# Patient Record
Sex: FEMALE | Race: OTHER | NOT HISPANIC OR LATINO | URBAN - METROPOLITAN AREA
[De-identification: names, ages, dates, MRNs, and addresses within clinical notes are randomized per-mention and may not be internally consistent; named-entity substitution may affect disease eponyms.]

---

## 2022-06-20 ENCOUNTER — EMERGENCY (EMERGENCY)
Facility: HOSPITAL | Age: 22
LOS: 1 days | Discharge: HOME | End: 2022-06-20
Attending: EMERGENCY MEDICINE
Payer: MEDICAID

## 2022-06-20 VITALS
SYSTOLIC BLOOD PRESSURE: 110 MMHG | HEART RATE: 99 BPM | DIASTOLIC BLOOD PRESSURE: 77 MMHG | RESPIRATION RATE: 18 BRPM | OXYGEN SATURATION: 98 % | TEMPERATURE: 99 F | WEIGHT: 164.91 LBS | HEIGHT: 65 IN

## 2022-06-20 LAB
ALBUMIN SERPL ELPH-MCNC: 4.3 G/DL — SIGNIFICANT CHANGE UP (ref 3.3–5)
ALP SERPL-CCNC: 68 U/L — SIGNIFICANT CHANGE UP (ref 40–120)
ALT FLD-CCNC: 35 U/L — SIGNIFICANT CHANGE UP (ref 10–45)
ANION GAP SERPL CALC-SCNC: 14 MMOL/L — SIGNIFICANT CHANGE UP (ref 5–17)
AST SERPL-CCNC: 24 U/L — SIGNIFICANT CHANGE UP (ref 10–40)
BILIRUB SERPL-MCNC: 0.4 MG/DL — SIGNIFICANT CHANGE UP (ref 0.2–1.2)
BUN SERPL-MCNC: 7 MG/DL — SIGNIFICANT CHANGE UP (ref 7–23)
CALCIUM SERPL-MCNC: 9.6 MG/DL — SIGNIFICANT CHANGE UP (ref 8.4–10.5)
CHLORIDE SERPL-SCNC: 99 MMOL/L — SIGNIFICANT CHANGE UP (ref 96–108)
CO2 SERPL-SCNC: 24 MMOL/L — SIGNIFICANT CHANGE UP (ref 22–31)
CREAT SERPL-MCNC: 0.49 MG/DL — LOW (ref 0.5–1.3)
EGFR: 137 ML/MIN/1.73M2 — SIGNIFICANT CHANGE UP
GLUCOSE SERPL-MCNC: 91 MG/DL — SIGNIFICANT CHANGE UP (ref 70–99)
POTASSIUM SERPL-MCNC: 4.2 MMOL/L — SIGNIFICANT CHANGE UP (ref 3.5–5.3)
POTASSIUM SERPL-SCNC: 4.2 MMOL/L — SIGNIFICANT CHANGE UP (ref 3.5–5.3)
PROT SERPL-MCNC: 7.5 G/DL — SIGNIFICANT CHANGE UP (ref 6–8.3)
SODIUM SERPL-SCNC: 137 MMOL/L — SIGNIFICANT CHANGE UP (ref 135–145)

## 2022-06-20 PROCEDURE — 99285 EMERGENCY DEPT VISIT HI MDM: CPT | Mod: 25

## 2022-06-20 PROCEDURE — 62270 DX LMBR SPI PNXR: CPT

## 2022-06-20 NOTE — ED ADULT NURSE NOTE - NSIMPLEMENTINTERV_GEN_ALL_ED
Implemented All Fall Risk Interventions:  [place] to call system. Call bell, personal items and telephone within reach. Instruct patient to call for assistance. Room bathroom lighting operational. Non-slip footwear when patient is off stretcher. Physically safe environment: no spills, clutter or unnecessary equipment. Stretcher in lowest position, wheels locked, appropriate side rails in place. Provide visual cue, wrist band, yellow gown, etc. Monitor gait and stability. Monitor for mental status changes and reorient to person, place, and time. Review medications for side effects contributing to fall risk. Reinforce activity limits and safety measures with patient and family.

## 2022-06-20 NOTE — ED PROVIDER NOTE - CLINICAL SUMMARY MEDICAL DECISION MAKING FREE TEXT BOX
Willi Hyde (MD): 21y F presents to the ED sent in by her neurologist for evaluation of GBS. Will discuss w/ neuro. Will likely ct head and lumbar puncture.

## 2022-06-20 NOTE — ED ADULT NURSE NOTE - OBJECTIVE STATEMENT
Pt is a 21y F sent to ED from neurologist for GBS evaluation. Pt states she has had bilateral lower leg numbness/tingling and fingertip numbness/tingling x2 weeks. Pt endorses feet pain. Pt denies fever, chills, cough, sob, chest pain, abd pain, NVD. Pt A&Ox3, breathing unlabored and spontaneous, abd soft nontender nondistended. Pt resting in stretcher, placed on cardiac monitor, bed in lowest position, educated on call bell, family member at bedside, aware of plan of care. Comfort and safety measures maintained.

## 2022-06-20 NOTE — ED PROVIDER NOTE - PROGRESS NOTE DETAILS
Willi Hyde (MD): lumbar puncture performed. Discussed w/ neuro to see patient. Chele Mendez D.O., PGY3 (Resident)  Patient signed out to me. Hemodynamically stable. LP w/o signs of GBS. Discussed and evaled by neuro. Stable for outpatient f/u. Strict return precautions with verbal understanding expressed.

## 2022-06-20 NOTE — ED PROVIDER NOTE - PATIENT PORTAL LINK FT
You can access the FollowMyHealth Patient Portal offered by [facility] by registering at the following website: [email or web address]. By joining [employer]’s FollowMyHealth portal, you will also be able to view your health information using other applications (apps) compatible with our system.

## 2022-06-20 NOTE — ED PROVIDER NOTE - ATTENDING CONTRIBUTION TO CARE
Dr. JESUS Hyde:  I have personally evaluated the patient and have documented above as appropriate. I perfomed a substantive portion of the visit including all aspects of the medical decision making.

## 2022-06-20 NOTE — ED ADULT TRIAGE NOTE - LOCATION:
Care Due:                  Date            Visit Type   Department     Provider  --------------------------------------------------------------------------------                                [facility]                              PRIMARY      MED/ INTERNAL  Last Visit: 06-      CARE ([facility])   MED/ PEDS      CESILIA BLANCAS  Next Visit: None Scheduled  None         None Found                                                            Last  Test          Frequency    Reason                     Performed    Due Date  --------------------------------------------------------------------------------    Office Visit  12 months..  allopurinoL, colchicine,   06- 06-                             glimepiride..............    CBC.........  12 months..  allopurinoL..............  06- 06-    CMP.........  12 months..  allopurinoL, colchicine,   06- 06-                             glimepiride..............    [facility] Embedded Care Gaps. Reference number: [id number]. 6/06/2022   6:23:15 PM CDT   Right arm;

## 2022-06-20 NOTE — ED PROVIDER NOTE - OBJECTIVE STATEMENT
21y F w/ PMHx of DM (Steglatro, stopped 1 month ago) presents to the ED sent in by her neurologist for evaluation of GBS. Pt states she went to her neurologist due to worsening B/L LE and fingertip numbness/tingling x2wks and B/L foot pain. Denies recent illness. Pt taking Gabapentin for pain now. Pt reports having a vitamin deficiency in recent blood test and has been taking vitamins. Also had intentional weight loss of about 90 lb since Jan. Denies back pain, HA, neck pain.    Neurologist Dr. Tiera Isbell in [place] T:[phone]

## 2022-06-20 NOTE — ED PROVIDER NOTE - NSFOLLOWUPINSTRUCTIONS_ED_ALL_ED_FT
YOU WERE SEEN IN THE ED FOR: numbness, tingling    FOLLOW UP WITH YOUR NEUROLOGIST.    PLEASE FOLLOW UP WITH YOUR PRIVATE PHYSICIAN WITHIN THE NEXT 48 HOURS. BRING COPIES OF YOUR RESULTS.    RETURN TO THE EMERGENCY DEPARTMENT IF YOU EXPERIENCE ANY NEW/CONCERNING/WORSENING SYMPTOMS.

## 2022-06-21 VITALS
RESPIRATION RATE: 18 BRPM | TEMPERATURE: 99 F | SYSTOLIC BLOOD PRESSURE: 116 MMHG | HEART RATE: 84 BPM | OXYGEN SATURATION: 100 % | DIASTOLIC BLOOD PRESSURE: 63 MMHG

## 2022-06-21 LAB
APPEARANCE CSF: CLEAR — SIGNIFICANT CHANGE UP
APPEARANCE CSF: CLEAR — SIGNIFICANT CHANGE UP
BASOPHILS # BLD AUTO: 0.07 K/UL — SIGNIFICANT CHANGE UP (ref 0–0.2)
BASOPHILS NFR BLD AUTO: 0.8 % — SIGNIFICANT CHANGE UP (ref 0–2)
COLOR CSF: SIGNIFICANT CHANGE UP
COLOR CSF: SIGNIFICANT CHANGE UP
EOSINOPHIL # BLD AUTO: 0.03 K/UL — SIGNIFICANT CHANGE UP (ref 0–0.5)
EOSINOPHIL NFR BLD AUTO: 0.4 % — SIGNIFICANT CHANGE UP (ref 0–6)
GLUCOSE CSF-MCNC: 60 MG/DL — SIGNIFICANT CHANGE UP (ref 40–70)
GRAM STN FLD: SIGNIFICANT CHANGE UP
HCG SERPL-ACNC: <2 MIU/ML — SIGNIFICANT CHANGE UP
HCT VFR BLD CALC: 42.4 % — SIGNIFICANT CHANGE UP (ref 34.5–45)
HGB BLD-MCNC: 13.8 G/DL — SIGNIFICANT CHANGE UP (ref 11.5–15.5)
IMM GRANULOCYTES NFR BLD AUTO: 0.1 % — SIGNIFICANT CHANGE UP (ref 0–1.5)
LDH CSF L TO P-CCNC: <15 U/L — SIGNIFICANT CHANGE UP
LDH FLD-CCNC: <15 U/L — SIGNIFICANT CHANGE UP
LYMPHOCYTES # BLD AUTO: 3.33 K/UL — HIGH (ref 1–3.3)
LYMPHOCYTES # BLD AUTO: 39 % — SIGNIFICANT CHANGE UP (ref 13–44)
LYMPHOCYTES # CSF: 90 % — HIGH (ref 40–80)
LYMPHOCYTES # CSF: 90 % — HIGH (ref 40–80)
MCHC RBC-ENTMCNC: 26.7 PG — LOW (ref 27–34)
MCHC RBC-ENTMCNC: 32.5 GM/DL — SIGNIFICANT CHANGE UP (ref 32–36)
MCV RBC AUTO: 82 FL — SIGNIFICANT CHANGE UP (ref 80–100)
MONOCYTES # BLD AUTO: 0.73 K/UL — SIGNIFICANT CHANGE UP (ref 0–0.9)
MONOCYTES NFR BLD AUTO: 8.5 % — SIGNIFICANT CHANGE UP (ref 2–14)
MONOS+MACROS NFR CSF: 10 % — LOW (ref 15–45)
MONOS+MACROS NFR CSF: 10 % — LOW (ref 15–45)
NEUTROPHILS # BLD AUTO: 4.37 K/UL — SIGNIFICANT CHANGE UP (ref 1.8–7.4)
NEUTROPHILS # CSF: SIGNIFICANT CHANGE UP (ref 0–6)
NEUTROPHILS # CSF: SIGNIFICANT CHANGE UP (ref 0–6)
NEUTROPHILS NFR BLD AUTO: 51.2 % — SIGNIFICANT CHANGE UP (ref 43–77)
NRBC # BLD: 0 /100 WBCS — SIGNIFICANT CHANGE UP (ref 0–0)
NRBC NFR CSF: <1 — SIGNIFICANT CHANGE UP (ref 0–5)
NRBC NFR CSF: <1 — SIGNIFICANT CHANGE UP (ref 0–5)
PLATELET # BLD AUTO: 289 K/UL — SIGNIFICANT CHANGE UP (ref 150–400)
PROT CSF-MCNC: 26 MG/DL — SIGNIFICANT CHANGE UP (ref 15–45)
RAPID RVP RESULT: SIGNIFICANT CHANGE UP
RBC # BLD: 5.17 M/UL — SIGNIFICANT CHANGE UP (ref 3.8–5.2)
RBC # CSF: 1 /UL — HIGH (ref 0–0)
RBC # CSF: 18 /UL — HIGH (ref 0–0)
RBC # FLD: 19.1 % — HIGH (ref 10.3–14.5)
SARS-COV-2 RNA SPEC QL NAA+PROBE: SIGNIFICANT CHANGE UP
SPECIMEN SOURCE: SIGNIFICANT CHANGE UP
TUBE TYPE: SIGNIFICANT CHANGE UP
TUBE TYPE: SIGNIFICANT CHANGE UP
WBC # BLD: 8.54 K/UL — SIGNIFICANT CHANGE UP (ref 3.8–10.5)
WBC # FLD AUTO: 8.54 K/UL — SIGNIFICANT CHANGE UP (ref 3.8–10.5)

## 2022-06-21 PROCEDURE — 85025 COMPLETE CBC W/AUTO DIFF WBC: CPT

## 2022-06-21 PROCEDURE — 62270 DX LMBR SPI PNXR: CPT

## 2022-06-21 PROCEDURE — 82945 GLUCOSE OTHER FLUID: CPT

## 2022-06-21 PROCEDURE — 70450 CT HEAD/BRAIN W/O DYE: CPT | Mod: 26,MA

## 2022-06-21 PROCEDURE — 82550 ASSAY OF CK (CPK): CPT

## 2022-06-21 PROCEDURE — 0225U NFCT DS DNA&RNA 21 SARSCOV2: CPT

## 2022-06-21 PROCEDURE — 84157 ASSAY OF PROTEIN OTHER: CPT

## 2022-06-21 PROCEDURE — 36415 COLL VENOUS BLD VENIPUNCTURE: CPT

## 2022-06-21 PROCEDURE — 83615 LACTATE (LD) (LDH) ENZYME: CPT

## 2022-06-21 PROCEDURE — 87205 SMEAR GRAM STAIN: CPT

## 2022-06-21 PROCEDURE — 80053 COMPREHEN METABOLIC PANEL: CPT

## 2022-06-21 PROCEDURE — 83735 ASSAY OF MAGNESIUM: CPT

## 2022-06-21 PROCEDURE — 89051 BODY FLUID CELL COUNT: CPT

## 2022-06-21 PROCEDURE — 70450 CT HEAD/BRAIN W/O DYE: CPT | Mod: MA

## 2022-06-21 PROCEDURE — 87070 CULTURE OTHR SPECIMN AEROBIC: CPT

## 2022-06-21 PROCEDURE — 99284 EMERGENCY DEPT VISIT MOD MDM: CPT | Mod: 25

## 2022-06-21 PROCEDURE — 84702 CHORIONIC GONADOTROPIN TEST: CPT

## 2022-06-21 NOTE — CONSULT NOTE ADULT - ASSESSMENT
Patient is a 22yo RT handed F with pmh of HLD and DM-II presents to [facility] for concern for GBS. Patient states she noted her symptoms 3 weeks ago of LE tingling with UE numbness b/l 2 weeks ago with improvement of symptoms. Patient negative LP results.     Impression   LE numbness with improvement of UE numbness 2/2 to possible metabolic etiology. Less likely GBS.   Recommendations:   Correction of electrolytes as needed  Adequate fluid hydration   b12, methylmalonic acid, homocysteine,   folate, tsh, hgb a1c, B1, B6, Iron studies, heavy metal screen, lead.   U/A   Continue current home medications  Recommend increasing Gabapentin dose, defer to PCP   Follow up with her Neurologist, Dr. Isbell upon discharge   No further inpatient neurological workup       Case discussed with Neurology Attending, Dr. Portillo.

## 2022-06-21 NOTE — CONSULT NOTE ADULT - NSCONSULTADDITIONALINFOA_GEN_ALL_CORE
This patient was seen overnight by the resident and I did not see or evaluate the patient before discharge from the ED. I discussed the care with the resident and my understanding is that the ED was in agreement with the plan. I agree with the plan as described above per my discussion with the resident.    STEPHEN

## 2022-06-21 NOTE — CONSULT NOTE ADULT - SUBJECTIVE AND OBJECTIVE BOX
MRN-[id number]  Patient is a 21y old  Female who presents with a chief complaint of   HPI:  Patient is a 22yo RT handed F with pmh of HLD and DM-II presents to [facility] for concern for GBS. Patient states she noted her symptoms 3 weeks ago of LE tingling. Patient then 2 weeks ago noted UE numbness whcih then improved to fingertip numbness. Patient states symptoms worsen when she lays down a certain way or applies pressure near her elbows. Patient has rt foot pain as well and was told it was neuropathy by her PCP. Patient was referred to a Neurologist, Dr. Isbell and was advised to go the ED for further workup for GBS. Patient states her symptoms are improving. Patient had negative LP and CT head results were unremarkable.     PAST MEDICAL & SURGICAL HISTORY:    FAMILY HISTORY:    Social Hx:  Nonsmoker, no drug or alcohol use    Home Medications:    MEDICATIONS  (STANDING):    MEDICATIONS  (PRN):    Allergies  No Known Allergies    Intolerances      REVIEW OF SYSTEMS  General: Denies fever and chills  Ophthalmologic: Denies blurred vision   Respiratory and Thorax:	Denies SOB  Cardiovascular:	Denies CP   Gastrointestinal:	Denies N, V   Genitourinary: Denies UC  Musculoskeletal:	Denies muscle pain   Neurological: Denies headaches 	      ROS: Pertinent positives in HPI, all other ROS were reviewed and are negative.      Vital Signs Last 24 Hrs  T(C): 37 (20 Jun 2022 23:15), Max: 37.1 (20 Jun 2022 18:44)  T(F): 98.6 (20 Jun 2022 23:15), Max: 98.7 (20 Jun 2022 18:44)  HR: 99 (20 Jun 2022 23:15) (99 - 99)  BP: 131/89 (20 Jun 2022 23:15) (110/77 - 131/89)  BP(mean): 94 (20 Jun 2022 23:15) (94 - 94)  RR: 18 (20 Jun 2022 23:15) (18 - 18)  SpO2: 100% (20 Jun 2022 23:15) (98% - 100%)    GENERAL EXAM:  Constitutional: awake and alert. NAD  HEENT: PERRLA, EOMI  Musculoskeletal: no joint swelling/tenderness, no abnormal movements  Skin: no rashes    NEUROLOGICAL EXAM:  MS: AAOX3,  speech is fluent.Follows simple and complex commands.   CN: VFF, EOMI, PERRL,   V1-3 intact, no facial asymmetry, t/p midline, SCM/trap intact.  Motor: Strength: 5/5 4x.   Tone: normal. Bulk: normal.   DTR 2+ symm.  in biceps/triceps/brachoradialis. + 1 b/l in patellar b/l.   ankle reflex b/l.   babinski negative b/l.   Plantar flex b/l.   Sensation: intact to LT//Vibration/Position/Temperature 4x.  Pinprick decreased in L4 level and below.   Coordination: intact 4x.   Gait:  Romberg negative, pull test negative; walks with narrow base, pivots in 2 steps.    NIHSS 0  mRS 0    Labs:   cbc                      13.8   8.54  )-----------( 289      ( 21 Jun 2022 00:22 )             42.4     [id number]    137  |  99  |  7   ----------------------------<  91  4.2   |  24  |  0.49<L>    Ca    9.6      20 Jun 2022 23:15  Mg     2.1     06-20    TPro  7.5  /  Alb  4.3  /  TBili  0.4  /  DBili  x   /  AST  24  /  ALT  35  /  AlkPhos  68  06-20    CardiacMarkersCARDIAC MARKERS ( 20 Jun 2022 23:15 )  x     / x     / 91 U/L / x     / x          LFTsLIVER FUNCTIONS - ( 20 Jun 2022 23:15 )  Alb: 4.3 g/dL / Pro: 7.5 g/dL / ALK PHOS: 68 U/L / ALT: 35 U/L / AST: 24 U/L / GGT: x             CSF:   Glucose 60   Protien 26   LDH <15       Radiology:  CT head- prelim neg

## 2022-06-23 LAB
CULTURE RESULTS: NO GROWTH — SIGNIFICANT CHANGE UP
SPECIMEN SOURCE: SIGNIFICANT CHANGE UP

## 2022-06-27 ENCOUNTER — INPATIENT (INPATIENT)
Facility: HOSPITAL | Age: 22
LOS: 10 days | Discharge: HOME | DRG: 640 | End: 2022-07-08
Attending: INTERNAL MEDICINE | Admitting: STUDENT IN AN ORGANIZED HEALTH CARE EDUCATION/TRAINING PROGRAM
Payer: MEDICAID

## 2022-06-27 VITALS
RESPIRATION RATE: 20 BRPM | TEMPERATURE: 98 F | OXYGEN SATURATION: 98 % | HEART RATE: 117 BPM | WEIGHT: 164.91 LBS | SYSTOLIC BLOOD PRESSURE: 112 MMHG | DIASTOLIC BLOOD PRESSURE: 77 MMHG | HEIGHT: 65 IN

## 2022-06-27 LAB
ALBUMIN SERPL ELPH-MCNC: 4.4 G/DL — SIGNIFICANT CHANGE UP (ref 3.3–5)
ALP SERPL-CCNC: 62 U/L — SIGNIFICANT CHANGE UP (ref 40–120)
ALT FLD-CCNC: 27 U/L — SIGNIFICANT CHANGE UP (ref 10–45)
ANION GAP SERPL CALC-SCNC: 12 MMOL/L — SIGNIFICANT CHANGE UP (ref 5–17)
APTT BLD: 26.5 SEC — LOW (ref 27.5–35.5)
AST SERPL-CCNC: 12 U/L — SIGNIFICANT CHANGE UP (ref 10–40)
BASOPHILS # BLD AUTO: 0.05 K/UL — SIGNIFICANT CHANGE UP (ref 0–0.2)
BASOPHILS NFR BLD AUTO: 0.6 % — SIGNIFICANT CHANGE UP (ref 0–2)
BILIRUB SERPL-MCNC: 0.6 MG/DL — SIGNIFICANT CHANGE UP (ref 0.2–1.2)
BUN SERPL-MCNC: 8 MG/DL — SIGNIFICANT CHANGE UP (ref 7–23)
CALCIUM SERPL-MCNC: 10 MG/DL — SIGNIFICANT CHANGE UP (ref 8.4–10.5)
CHLORIDE SERPL-SCNC: 103 MMOL/L — SIGNIFICANT CHANGE UP (ref 96–108)
CO2 SERPL-SCNC: 25 MMOL/L — SIGNIFICANT CHANGE UP (ref 22–31)
CREAT SERPL-MCNC: 0.57 MG/DL — SIGNIFICANT CHANGE UP (ref 0.5–1.3)
EGFR: 132 ML/MIN/1.73M2 — SIGNIFICANT CHANGE UP
EOSINOPHIL # BLD AUTO: 0.16 K/UL — SIGNIFICANT CHANGE UP (ref 0–0.5)
EOSINOPHIL NFR BLD AUTO: 2.1 % — SIGNIFICANT CHANGE UP (ref 0–6)
GLUCOSE SERPL-MCNC: 115 MG/DL — HIGH (ref 70–99)
HCG SERPL-ACNC: <2 MIU/ML — SIGNIFICANT CHANGE UP
HCT VFR BLD CALC: 42.4 % — SIGNIFICANT CHANGE UP (ref 34.5–45)
HGB BLD-MCNC: 13.3 G/DL — SIGNIFICANT CHANGE UP (ref 11.5–15.5)
IMM GRANULOCYTES NFR BLD AUTO: 0.4 % — SIGNIFICANT CHANGE UP (ref 0–1.5)
INR BLD: 1.15 RATIO — SIGNIFICANT CHANGE UP (ref 0.88–1.16)
LYMPHOCYTES # BLD AUTO: 2.61 K/UL — SIGNIFICANT CHANGE UP (ref 1–3.3)
LYMPHOCYTES # BLD AUTO: 33.7 % — SIGNIFICANT CHANGE UP (ref 13–44)
MAGNESIUM SERPL-MCNC: 2 MG/DL — SIGNIFICANT CHANGE UP (ref 1.6–2.6)
MCHC RBC-ENTMCNC: 26.2 PG — LOW (ref 27–34)
MCHC RBC-ENTMCNC: 31.4 GM/DL — LOW (ref 32–36)
MCV RBC AUTO: 83.6 FL — SIGNIFICANT CHANGE UP (ref 80–100)
MONOCYTES # BLD AUTO: 0.54 K/UL — SIGNIFICANT CHANGE UP (ref 0–0.9)
MONOCYTES NFR BLD AUTO: 7 % — SIGNIFICANT CHANGE UP (ref 2–14)
NEUTROPHILS # BLD AUTO: 4.36 K/UL — SIGNIFICANT CHANGE UP (ref 1.8–7.4)
NEUTROPHILS NFR BLD AUTO: 56.2 % — SIGNIFICANT CHANGE UP (ref 43–77)
NRBC # BLD: 0 /100 WBCS — SIGNIFICANT CHANGE UP (ref 0–0)
PHOSPHATE SERPL-MCNC: 4.4 MG/DL — SIGNIFICANT CHANGE UP (ref 2.5–4.5)
PLATELET # BLD AUTO: 288 K/UL — SIGNIFICANT CHANGE UP (ref 150–400)
POTASSIUM SERPL-MCNC: 3.8 MMOL/L — SIGNIFICANT CHANGE UP (ref 3.5–5.3)
POTASSIUM SERPL-SCNC: 3.8 MMOL/L — SIGNIFICANT CHANGE UP (ref 3.5–5.3)
PROT SERPL-MCNC: 7.7 G/DL — SIGNIFICANT CHANGE UP (ref 6–8.3)
PROTHROM AB SERPL-ACNC: 13.4 SEC — SIGNIFICANT CHANGE UP (ref 10.5–13.4)
RBC # BLD: 5.07 M/UL — SIGNIFICANT CHANGE UP (ref 3.8–5.2)
RBC # FLD: 18.1 % — HIGH (ref 10.3–14.5)
SARS-COV-2 RNA SPEC QL NAA+PROBE: SIGNIFICANT CHANGE UP
SODIUM SERPL-SCNC: 140 MMOL/L — SIGNIFICANT CHANGE UP (ref 135–145)
WBC # BLD: 7.75 K/UL — SIGNIFICANT CHANGE UP (ref 3.8–10.5)
WBC # FLD AUTO: 7.75 K/UL — SIGNIFICANT CHANGE UP (ref 3.8–10.5)

## 2022-06-27 PROCEDURE — 99285 EMERGENCY DEPT VISIT HI MDM: CPT

## 2022-06-27 RX ORDER — IBUPROFEN 200 MG
400 TABLET ORAL ONCE
Refills: 0 | Status: COMPLETED | OUTPATIENT
Start: 2022-06-27 | End: 2022-06-27

## 2022-06-27 RX ORDER — ACETAMINOPHEN 500 MG
975 TABLET ORAL ONCE
Refills: 0 | Status: COMPLETED | OUTPATIENT
Start: 2022-06-27 | End: 2022-06-27

## 2022-06-27 RX ADMIN — Medication 400 MILLIGRAM(S): at 23:28

## 2022-06-27 RX ADMIN — Medication 975 MILLIGRAM(S): at 22:05

## 2022-06-27 RX ADMIN — Medication 975 MILLIGRAM(S): at 23:00

## 2022-06-27 NOTE — ED PROVIDER NOTE - RAPID ASSESSMENT
21y F w/ PMHx of DM c/o B/L leg pain [id number] and swelling since Monday. Pt was seen last Monday to r/o GBS. Had an LP and CT that was negative. On Gabapentin w/o relief. Pt is well appearing in triage.    Apollo WEBSTER (Rehana) have documented this rapid assessment note under the dictation of Laurie Cosby () which has been reviewed and affirmed to be accurate. Patient was seen as a QPA patient. The patient will be seen and further worked up in the main emergency department and their care will be completed by the main emergency department team along with a thorough physical exam. Receiving team will follow up on labs, analgesia, any clinical imaging, reassess and disposition as clinically indicated, all decisions regarding the progression of care will be made at their discretion. 21y F w/ PMHx of DM c/o B/L leg pain [id number] and swelling since Monday. Pt was seen last Monday to r/o GBS. Had an LP and CT that was negative. On Gabapentin w/o relief. Pt is well appearing in triage.    Apollo WEBSTER (Scribe) have documented this rapid assessment note under the dictation of Laurei Cosby () which has been reviewed and affirmed to be accurate. Patient was seen as a QPA patient. The patient will be seen and further worked up in the main emergency department and their care will be completed by the main emergency department team along with a thorough physical exam. Receiving team will follow up on labs, analgesia, any clinical imaging, reassess and disposition as clinically indicated, all decisions regarding the progression of care will be made at their discretion.    Dr. Alea WEBSTER,  personally performed the rapid assessment described in the documentation, reviewed the rapid assessment documentation recorded by the scribe in my presence and it accurately and completely records my words and action.    Patient was seen as a tele QDOC patient, the patient will be seen and further worked up in the main emergency department and their care will be completed by the main emergency department team along with a thorough physical exam. Receiving team will follow up on labs, analgesia, any clinical imaging, reassess and disposition as clinically indicated, all decisions regarding the progression of care will be made at their discretion. 21y F w/ PMHx of DM c/o B/L leg pain [id number] and swelling since Monday. Pt was seen last Monday to r/o GBS. Had an LP and CT that was negative. On Gabapentin w/o relief. Pt is well appearing in triage.    Apollo WEBSTER (Scribe) have documented this rapid assessment note under the dictation of Laurie Cosby () which has been reviewed and affirmed to be accurate. Patient was seen as a QPA patient. The patient will be seen and further worked up in the main emergency department and their care will be completed by the main emergency department team along with a thorough physical exam. Receiving team will follow up on labs, analgesia, any clinical imaging, reassess and disposition as clinically indicated, all decisions regarding the progression of care will be made at their discretion.    Dr. Alea WEBSTER,  personally performed the rapid assessment described in the documentation, reviewed the rapid assessment documentation recorded by the scribe in my presence and it accurately and completely records my words and action.    Patient was seen as a tele QDOC patient, the patient will be seen and further worked up in the main emergency department and their care will be completed by the main emergency department team along with a thorough physical exam. Receiving team will follow up on labs, analgesia, any clinical imaging, reassess and disposition as clinically indicated, all decisions regarding the progression of care will be made at their discretion.    Dr. Alea WEBSTER,  personally performed the rapid assessment described in the documentation, reviewed the rapid assessment documentation recorded by the scribe in my presence and it accurately and completely records my words and action.    Patient was seen as a tele QDOC patient, the patient will be seen and further worked up in the main emergency department and their care will be completed by the main emergency department team along with a thorough physical exam. Receiving team will follow up on labs, analgesia, any clinical imaging, reassess and disposition as clinically indicated, all decisions regarding the progression of care will be made at their discretion.

## 2022-06-27 NOTE — ED ADULT TRIAGE NOTE - CHIEF COMPLAINT QUOTE
pain in B/L feet x 1 month, swelling since monday. was seen here last monday to r/o GBS. had an LP and CT that were negative

## 2022-06-27 NOTE — ED PROVIDER NOTE - PHYSICAL EXAMINATION
Gen: mild distress at rest, AOx3, able to make needs known, non-toxic  Head: NCAT  HEENT: EOMI, normal conjunctiva  Lung: no respiratory distress, speaking in full sentences  CV: RRR, pulses bilaterally   Abd: soft, NTND, no guarding, no CVA tenderness  MSK: no visible bony deformities. +swelling of bilateral lower extremities up to lower calves. +pain to physical manipulation of toes, +pain of bilateral soles when asking patient to sit up.   Neuro: No focal sensory or motor deficits   Skin: Warm, well perfused, no rash Gen: mild distress at rest, AOx3, able to make needs known, non-toxic  Head: NCAT  HEENT: EOMI, normal conjunctiva  Lung: no respiratory distress, speaking in full sentences  CV: RRR, pulses bilaterally   Abd: soft, NTND, no guarding, no CVA tenderness  MSK: no visible bony deformities. +pain to physical manipulation of toes, +pain of bilateral soles. Pain even with mild brushing contact of soles is in severe pain. Unable to walk d/t pain ,tearful with attempt.   Neuro: No focal sensory or motor deficits   Skin: Warm, well perfused, no rash

## 2022-06-27 NOTE — ED ADULT NURSE NOTE - OBJECTIVE STATEMENT
21y female A&OX4 coming in through triage complaining of bilateral feet pain. No PMHX. Pt reports these pain started a month ago. Pt states it has gotten worse despite the gabapentin her neurologists started. Pt has bilateral swelling on feet. Pt states it started this week. Pt denies chest pain, shortness of breath, abd pain, blood in urine or stool, lightheadedness, weakness. Labs were drawn and sent to lab. Pt pending dispo.

## 2022-06-27 NOTE — ED PROVIDER NOTE - CLINICAL SUMMARY MEDICAL DECISION MAKING FREE TEXT BOX
Fletcher, PGY1 - bilateral lower leg pain, no weakness, previous CT and LP normal, high suspicion for neurological damage causing symptoms, tingling, numbness; unclear etiology. lab values noted, will add tsh, neuro consult, reassess. *The above represents an initial assessment/impression. Please refer to progress notes for potential changes in patient clinical course* Fletcher, PGY1 - bilateral lower leg pain, no weakness, previous CT and LP normal, high suspicion for neurological damage causing symptoms, tingling, numbness; unclear etiology. lab values noted, will add tsh, neuro consult, reassess. *The above represents an initial assessment/impression. Please refer to progress notes for potential changes in patient clinical course*    Attending MD Montoya : Patient here with likely neuropathic pain of bilateral lower extremities. Low suspicion of DVT given pain bilateral, is mostly on soles and no swelling or erythema not visualized. Patient unable to walk 2/2 pain, worsening over the last few weeks despite gabapentin. WIll admit to medicine, neuro recommending nortriptyline.

## 2022-06-27 NOTE — ED PROVIDER NOTE - PRO INTERPRETER NEED 2
English Airway patent, nasal mucosa clear, mouth with normal mucosa. Throat has no vesicles or other lesions.

## 2022-06-28 DIAGNOSIS — Z29.9 ENCOUNTER FOR PROPHYLACTIC MEASURES, UNSPECIFIED: ICD-10-CM

## 2022-06-28 DIAGNOSIS — G62.9 POLYNEUROPATHY, UNSPECIFIED: ICD-10-CM

## 2022-06-28 DIAGNOSIS — E11.9 TYPE 2 DIABETES MELLITUS WITHOUT COMPLICATIONS: ICD-10-CM

## 2022-06-28 DIAGNOSIS — M79.2 NEURALGIA AND NEURITIS, UNSPECIFIED: ICD-10-CM

## 2022-06-28 LAB
A1C WITH ESTIMATED AVERAGE GLUCOSE RESULT: 4.8 % — SIGNIFICANT CHANGE UP (ref 4–5.6)
A1C WITH ESTIMATED AVERAGE GLUCOSE RESULT: 4.8 % — SIGNIFICANT CHANGE UP (ref 4–5.6)
ALBUMIN SERPL ELPH-MCNC: 4 G/DL — SIGNIFICANT CHANGE UP (ref 3.3–5)
ALP SERPL-CCNC: 60 U/L — SIGNIFICANT CHANGE UP (ref 40–120)
ALT FLD-CCNC: 28 U/L — SIGNIFICANT CHANGE UP (ref 10–45)
AST SERPL-CCNC: 13 U/L — SIGNIFICANT CHANGE UP (ref 10–40)
B BURGDOR C6 AB SER-ACNC: NEGATIVE — SIGNIFICANT CHANGE UP
B BURGDOR IGG+IGM SER-ACNC: 0.13 INDEX — SIGNIFICANT CHANGE UP (ref 0.01–0.89)
BILIRUB DIRECT SERPL-MCNC: 0.1 MG/DL — SIGNIFICANT CHANGE UP (ref 0–0.3)
BILIRUB INDIRECT FLD-MCNC: 0.3 MG/DL — SIGNIFICANT CHANGE UP (ref 0.2–1)
BILIRUB SERPL-MCNC: 0.4 MG/DL — SIGNIFICANT CHANGE UP (ref 0.2–1.2)
CERULOPLASMIN SERPL-MCNC: 27 MG/DL — SIGNIFICANT CHANGE UP (ref 16–45)
CRP SERPL-MCNC: 4 MG/L — SIGNIFICANT CHANGE UP (ref 0–4)
ERYTHROCYTE [SEDIMENTATION RATE] IN BLOOD: 31 MM/HR — HIGH (ref 0–15)
ESTIMATED AVERAGE GLUCOSE: 91 MG/DL — SIGNIFICANT CHANGE UP (ref 68–114)
ESTIMATED AVERAGE GLUCOSE: 91 MG/DL — SIGNIFICANT CHANGE UP (ref 68–114)
FERRITIN SERPL-MCNC: 18 NG/ML — SIGNIFICANT CHANGE UP (ref 15–150)
FOLATE SERPL-MCNC: 2 NG/ML — LOW
FOLATE SERPL-MCNC: <2 NG/ML — LOW
GLUCOSE BLDC GLUCOMTR-MCNC: 82 MG/DL — SIGNIFICANT CHANGE UP (ref 70–99)
HCYS SERPL-MCNC: 14.2 UMOL/L — SIGNIFICANT CHANGE UP
HIV 1+2 AB+HIV1 P24 AG SERPL QL IA: SIGNIFICANT CHANGE UP
IRON SATN MFR SERPL: 23 UG/DL — LOW (ref 30–160)
IRON SATN MFR SERPL: 7 % — LOW (ref 14–50)
LEAD BLD-MCNC: <1 UG/DL — SIGNIFICANT CHANGE UP (ref 0–4)
PROT SERPL-MCNC: 6.7 G/DL — SIGNIFICANT CHANGE UP (ref 6–8.3)
PROT SERPL-MCNC: 6.8 G/DL — SIGNIFICANT CHANGE UP (ref 6–8.3)
PROT SERPL-MCNC: 6.8 G/DL — SIGNIFICANT CHANGE UP (ref 6–8.3)
RHEUMATOID FACT SERPL-ACNC: <10 IU/ML — SIGNIFICANT CHANGE UP (ref 0–13)
T PALLIDUM AB TITR SER: NEGATIVE — SIGNIFICANT CHANGE UP
T3 SERPL-MCNC: 136 NG/DL — SIGNIFICANT CHANGE UP (ref 80–200)
T4 AB SER-ACNC: 10.5 UG/DL — SIGNIFICANT CHANGE UP (ref 4.6–12)
TIBC SERPL-MCNC: 338 UG/DL — SIGNIFICANT CHANGE UP (ref 220–430)
TSH SERPL-MCNC: 2.35 UIU/ML — SIGNIFICANT CHANGE UP (ref 0.27–4.2)
TSH SERPL-MCNC: 2.55 UIU/ML — SIGNIFICANT CHANGE UP (ref 0.27–4.2)
UIBC SERPL-MCNC: 315 UG/DL — SIGNIFICANT CHANGE UP (ref 110–370)
VIT B12 SERPL-MCNC: 1891 PG/ML — HIGH (ref 232–1245)
VIT B12 SERPL-MCNC: 1953 PG/ML — HIGH (ref 232–1245)

## 2022-06-28 PROCEDURE — 99222 1ST HOSP IP/OBS MODERATE 55: CPT | Mod: GC

## 2022-06-28 RX ORDER — NORTRIPTYLINE HYDROCHLORIDE 10 MG/1
10 CAPSULE ORAL ONCE
Refills: 0 | Status: COMPLETED | OUTPATIENT
Start: 2022-06-28 | End: 2022-06-28

## 2022-06-28 RX ORDER — IBUPROFEN 200 MG
400 TABLET ORAL THREE TIMES A DAY
Refills: 0 | Status: DISCONTINUED | OUTPATIENT
Start: 2022-06-28 | End: 2022-07-01

## 2022-06-28 RX ORDER — INFLUENZA VIRUS VACCINE 15; 15; 15; 15 UG/.5ML; UG/.5ML; UG/.5ML; UG/.5ML
0.5 SUSPENSION INTRAMUSCULAR ONCE
Refills: 0 | Status: DISCONTINUED | OUTPATIENT
Start: 2022-06-28 | End: 2022-07-08

## 2022-06-28 RX ORDER — THIAMINE MONONITRATE (VIT B1) 100 MG
100 TABLET ORAL DAILY
Refills: 0 | Status: DISCONTINUED | OUTPATIENT
Start: 2022-06-28 | End: 2022-07-08

## 2022-06-28 RX ORDER — ACETAMINOPHEN 500 MG
1000 TABLET ORAL ONCE
Refills: 0 | Status: COMPLETED | OUTPATIENT
Start: 2022-06-28 | End: 2022-06-28

## 2022-06-28 RX ORDER — ACETAMINOPHEN 500 MG
650 TABLET ORAL EVERY 6 HOURS
Refills: 0 | Status: DISCONTINUED | OUTPATIENT
Start: 2022-06-28 | End: 2022-07-08

## 2022-06-28 RX ORDER — PREGABALIN 225 MG/1
2 CAPSULE ORAL
Qty: 0 | Refills: 0 | DISCHARGE

## 2022-06-28 RX ORDER — IBUPROFEN 200 MG
400 TABLET ORAL ONCE
Refills: 0 | Status: COMPLETED | OUTPATIENT
Start: 2022-06-28 | End: 2022-06-28

## 2022-06-28 RX ORDER — MULTIVIT-MIN/FERROUS GLUCONATE 9 MG/15 ML
1 LIQUID (ML) ORAL
Qty: 0 | Refills: 0 | DISCHARGE

## 2022-06-28 RX ORDER — PYRIDOXINE HCL (VITAMIN B6) 100 MG
100 TABLET ORAL DAILY
Refills: 0 | Status: DISCONTINUED | OUTPATIENT
Start: 2022-06-28 | End: 2022-07-08

## 2022-06-28 RX ORDER — ENOXAPARIN SODIUM 100 MG/ML
40 INJECTION SUBCUTANEOUS EVERY 24 HOURS
Refills: 0 | Status: DISCONTINUED | OUTPATIENT
Start: 2022-06-28 | End: 2022-07-08

## 2022-06-28 RX ORDER — IBUPROFEN 200 MG
200 TABLET ORAL ONCE
Refills: 0 | Status: DISCONTINUED | OUTPATIENT
Start: 2022-06-28 | End: 2022-06-28

## 2022-06-28 RX ORDER — NORTRIPTYLINE HYDROCHLORIDE 10 MG/1
10 CAPSULE ORAL DAILY
Refills: 0 | Status: DISCONTINUED | OUTPATIENT
Start: 2022-06-28 | End: 2022-07-02

## 2022-06-28 RX ORDER — MULTIVIT-MIN/FERROUS GLUCONATE 9 MG/15 ML
1 LIQUID (ML) ORAL DAILY
Refills: 0 | Status: DISCONTINUED | OUTPATIENT
Start: 2022-06-28 | End: 2022-07-08

## 2022-06-28 RX ORDER — PYRIDOXINE HCL (VITAMIN B6) 100 MG
2 TABLET ORAL
Qty: 0 | Refills: 0 | DISCHARGE

## 2022-06-28 RX ORDER — LANOLIN ALCOHOL/MO/W.PET/CERES
3 CREAM (GRAM) TOPICAL AT BEDTIME
Refills: 0 | Status: DISCONTINUED | OUTPATIENT
Start: 2022-06-28 | End: 2022-07-08

## 2022-06-28 RX ORDER — FOLIC ACID 0.8 MG
1 TABLET ORAL DAILY
Refills: 0 | Status: DISCONTINUED | OUTPATIENT
Start: 2022-06-28 | End: 2022-07-05

## 2022-06-28 RX ORDER — PREGABALIN 225 MG/1
1000 CAPSULE ORAL DAILY
Refills: 0 | Status: DISCONTINUED | OUTPATIENT
Start: 2022-06-28 | End: 2022-07-08

## 2022-06-28 RX ORDER — GABAPENTIN 400 MG/1
300 CAPSULE ORAL THREE TIMES A DAY
Refills: 0 | Status: DISCONTINUED | OUTPATIENT
Start: 2022-06-28 | End: 2022-06-29

## 2022-06-28 RX ORDER — FOLIC ACID 0.8 MG
5 TABLET ORAL DAILY
Refills: 0 | Status: DISCONTINUED | OUTPATIENT
Start: 2022-06-28 | End: 2022-06-28

## 2022-06-28 RX ORDER — THIAMINE MONONITRATE (VIT B1) 100 MG
1 TABLET ORAL
Qty: 0 | Refills: 0 | DISCHARGE

## 2022-06-28 RX ADMIN — Medication 1000 MILLIGRAM(S): at 19:09

## 2022-06-28 RX ADMIN — NORTRIPTYLINE HYDROCHLORIDE 10 MILLIGRAM(S): 10 CAPSULE ORAL at 13:49

## 2022-06-28 RX ADMIN — Medication 400 MILLIGRAM(S): at 18:39

## 2022-06-28 RX ADMIN — Medication 400 MILLIGRAM(S): at 00:43

## 2022-06-28 RX ADMIN — NORTRIPTYLINE HYDROCHLORIDE 10 MILLIGRAM(S): 10 CAPSULE ORAL at 02:37

## 2022-06-28 RX ADMIN — Medication 1 MILLIGRAM(S): at 21:07

## 2022-06-28 RX ADMIN — PREGABALIN 1000 MICROGRAM(S): 225 CAPSULE ORAL at 21:07

## 2022-06-28 RX ADMIN — GABAPENTIN 300 MILLIGRAM(S): 400 CAPSULE ORAL at 05:25

## 2022-06-28 RX ADMIN — Medication 400 MILLIGRAM(S): at 21:01

## 2022-06-28 RX ADMIN — GABAPENTIN 300 MILLIGRAM(S): 400 CAPSULE ORAL at 21:01

## 2022-06-28 RX ADMIN — GABAPENTIN 300 MILLIGRAM(S): 400 CAPSULE ORAL at 13:49

## 2022-06-28 RX ADMIN — ENOXAPARIN SODIUM 40 MILLIGRAM(S): 100 INJECTION SUBCUTANEOUS at 18:48

## 2022-06-28 RX ADMIN — Medication 100 MILLIGRAM(S): at 21:01

## 2022-06-28 RX ADMIN — Medication 400 MILLIGRAM(S): at 22:00

## 2022-06-28 NOTE — PATIENT PROFILE ADULT - FALL HARM RISK - RISK INTERVENTIONS

## 2022-06-28 NOTE — OCCUPATIONAL THERAPY INITIAL EVALUATION ADULT - PERTINENT HX OF CURRENT PROBLEM, REHAB EVAL
21 year old RH female with a past medical history of DM and HLD who presents to the ED for continued lower extremity pain and paresthesias. At baseline, the patient is ambulatory without assistive devices and is oriented to person, place, time. Patient stated that she has been having about a month long period of LE numbness and paresthesias which have progressed over the last several day to becoming very painful especially on the soles of her feet. CT Negative.

## 2022-06-28 NOTE — H&P ADULT - NSHPLABSRESULTS_GEN_ALL_CORE
Folate, Serum: <2.0: Test Repeated ng/mL (06.28.22 @ 07:11)   Folate, Serum: 2.0 ng/mL (06.27.22 @ 23:56) Vitamin B12, Serum: 1891 pg/mL (06.28.22 @ 07:11)  A1C with Estimated Average Glucose Result: 4.8: Method: Immunoassay       < from: CT Head No Cont (06.21.22 @ 00:44) >    IMPRESSION:    No acute intracranial hemorrhage, mass effect, or CT evidence of a large   vascular territory infarct.    If there are new or persistent symptoms, consider further evaluation with   MRI provided no contraindications.    < end of copied text >

## 2022-06-28 NOTE — PHYSICAL THERAPY INITIAL EVALUATION ADULT - PERTINENT HX OF CURRENT PROBLEM, REHAB EVAL
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 Vitamin deficiency, minerals deficiency/toxicity, rheumatologic process. Additionally with recent treatment of DM and improvement of A1c can consider treatment induced neuropathy of DM.

## 2022-06-28 NOTE — OCCUPATIONAL THERAPY INITIAL EVALUATION ADULT - LIVES WITH, PROFILE
Pt lives in apartment with parents, 1 flight of [address], has a tub shower. Pt's father recently purchased RW to ease mobility in the apartment./parents

## 2022-06-28 NOTE — H&P ADULT - PROBLEM SELECTOR PLAN 1
- Previous CT brain unremarkable. Previous LP unremarkable.   - Neurology consulted, appreciate recs.   - Increase Gabapentin to 300 mg TID, per neurology.   - Follow-up pending laboratory studies.   - Folate level < 2.0. Will supplement.

## 2022-06-28 NOTE — PHYSICAL THERAPY INITIAL EVALUATION ADULT - ADDITIONAL COMMENTS
Pt lives w/ her parents in an apt w/ one flight of stairs at entry +RHR was independent w/ all ADLs and functional mobility at baseline, pt recently started using a RW 2/2 foot pain.

## 2022-06-28 NOTE — OCCUPATIONAL THERAPY INITIAL EVALUATION ADULT - STRENGTHENING, PT EVAL
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

## 2022-06-28 NOTE — H&P ADULT - NSHPREVIEWOFSYSTEMS_GEN_ALL_CORE
REVIEW OF SYSTEMS:    CONSTITUTIONAL: No weakness, fevers or chills  EYES/ENT: No visual changes;  No vertigo or throat pain   NECK: No pain or stiffness  RESPIRATORY: No cough, wheezing, hemoptysis; No shortness of breath  CARDIOVASCULAR: No chest pain or palpitations  GASTROINTESTINAL: No abdominal or epigastric pain. No nausea, vomiting, or hematemesis; No diarrhea or constipation. No melena or hematochezia.   GENITOURINARY: No dysuria, frequency or hematuria  NEUROLOGICAL: See HPI.   SKIN: No itching, rashes. Feet swelling, significantly improved.

## 2022-06-28 NOTE — H&P ADULT - ASSESSMENT
22 yo F with PMH of DM and HDL presents with distal neuropathic pain to bilateral legs and swelling. Patient previously worked up outpatient with EMG, along with CT brain and LP at ED visit.

## 2022-06-28 NOTE — H&P ADULT - ATTENDING COMMENTS
22 yo F with PMHx of DM and HDL no longer on medication presents with bilateral foot pain and numbing for 1 month.    EMG doen Out patient   s/p LP CT head done last ED visit unremarkable     Patient seen and examined   Parents Bed side       T(C): 36.7 (06-28-22 @ 20:52), Max: 36.7 (06-28-22 @ 11:11)  HR: 104 (06-28-22 @ 20:52) (94 - 105)  BP: 119/98 (06-28-22 @ 20:52) (116/81 - 131/88)  RR: 20 (06-28-22 @ 20:52) (18 - 20)  SpO2: 98% (06-28-22 @ 20:52) (98% - 100%)    Reviewed labs and imaging     Distal Neuropathy   Folate Deficiency   Neuro eval appreciated   R/O mineral deficiencies Rheum etiology    Add Lyme titres    B12 levels normal   Patient was on Metformin x 2 days and was on Staglatro which was discontinued 4 months ago   Supplement Folic Acid   Increase Gabapentin   Ibuprofen prn for pain

## 2022-06-28 NOTE — H&P ADULT - NSHPSOCIALHISTORY_GEN_ALL_CORE
Lives with parents and brother. Currently in college. No tobacco use. Occasional marijuana use. Rare alcohol use.

## 2022-06-28 NOTE — OCCUPATIONAL THERAPY INITIAL EVALUATION ADULT - LIGHT TOUCH SENSATION, LLE, REHAB EVAL
Pt states B/L LE distal to knee tingling and numbness, occasional pins/needles sensation, difficulty tolerated socks and foot coverings 2* to pain/within normal limits

## 2022-06-28 NOTE — PATIENT PROFILE ADULT - NSPRONUTRITIONRISK_GEN_A_NUR
SUMMARY  1st Attempt to complete SW follow-up for Outpatient Care Management; left message requesting return call.  LCSW will reattempt at a later date.      INTERVENTION  None.     Plan  Follow up on 7/18/18.                                          No indicators present

## 2022-06-28 NOTE — H&P ADULT - HISTORY OF PRESENT ILLNESS
20 yo F with PMHx of DM and HDL no longer on medication presents with bilateral foot pain and numbing for 1 month. Patient was seen by outpatient neurologist who performed EMG, results unremarkable, and sent patient to the ED on 7/21/2022 for workup to r/o GBS. CT brain w/o contrast and LP were both done during that ED visit, and both were unremarkable. Patient reports pain worsened progressively over the past 1 week, with swelling of the feet and inability to ambulate. Patient reports pain worsens upon movement and feels as if her feet are "asleep". Pain is 8/10 in severity.   Patient at home takes Gabapentin 300 mg BID, and was prescribed vitamins B1, B6, B12, and multivitamin supplements. Patient was previously on atorvastatin and metformin, but discontinued metformin due to diarrhea and changed to steglatro. Both atorvastatin and metformin were discontinued due to improvement in a1c and lipids.   Neurology was consulted and recommended admission with laboratory workup, including workup for vitamin deficiency.

## 2022-06-28 NOTE — CONSULT NOTE ADULT - SUBJECTIVE AND OBJECTIVE BOX
HPI:  Estela Clark is a 21 year old RH female with a past medical history of DM and HLD who presents to the ED for continued lower extremity pain and paresthesias. At baseline, the patient is ambulatory without assistive devices and is oriented to person, place, time. Patient stated that she has been having about a month long period of LE numbness and paresthesias which have progressed over the last several day to becoming very painful especially on the soles of her feet. She stated that the pain is a burning pain on the soles of her feet and has become so painful that she cannot lift her legs or put pressure on her soles. She noted that she would now have to crawl in order to ambulate because the pain was so intense. She had a workup for GBS when she presented on June 21st which was negative and she was discharged. She has followed up with neurologist Dr. Isbell who had increased her gabapentin and she currently takes 300mg bid. Occasionally the patient would also get finger tip numbness on her UE. Noted that after her LP she had a headache and some mild difficult hearing which has since resolved. Currently with no headaches, hearing loss, visual changes, dysphagia, dysarthria, weakness. No recent illnesses or sick contacts. Notes that she is not currently taking any DM medications due to her A1c improving. Noted she was taking pyridoxine for 6 days but had since stopped. Has been taking B12 supplementation.    NIHSS: 1   MRS: 0      REVIEW OF SYSTEMS    A 10-system ROS was performed and is negative except for those items noted above and/or in the HPI.    PAST MEDICAL & SURGICAL HISTORY:  DM (diabetes mellitus), type 2    No significant past surgical history    FAMILY HISTORY:  Family history of "arthritis"    SOCIAL HISTORY:   T/E/D: no reported tobacco, alcohol, illicit drugs  Lives with family    MEDICATIONS (HOME):  Home Medications:    MEDICATIONS  (STANDING):  nortriptyline 10 milliGRAM(s) Oral Once    MEDICATIONS  (PRN):    ALLERGIES/INTOLERANCES:  Allergies  No Known Allergies    Intolerances    VITALS & EXAMINATION:  Vital Signs Last 24 Hrs  T(C): 36.7 (27 Jun 2022 22:20), Max: 36.8 (27 Jun 2022 17:23)  T(F): 98.1 (27 Jun 2022 22:20), Max: 98.2 (27 Jun 2022 17:23)  HR: 101 (27 Jun 2022 22:20) (101 - 117)  BP: 108/73 (27 Jun 2022 22:20) (108/73 - 112/77)  BP(mean): --  RR: 20 (27 Jun 2022 22:20) (20 - 20)  SpO2: 99% (27 Jun 2022 22:20) (98% - 99%)    General:  Constitutional: Appears stated age, in no apparent distress including pain  Head: Normocephalic & atraumatic.    Neurological (>12):  MS: Awake, alert, oriented to person, place, situation, time. Normal affect. Follows all commands.    Language: Speech is clear, fluent.    CNs: PERRL (R = 4mm, L = 4mm). VF intact in all 4 quadrants. EOMI no nystagmus. V1-3 intact to LT. No facial asymmetry b/l. Symmetric palate elevation in midline.Shoulder shrug intact b/l. Tongue midline, normal movements, no atrophy.    Motor: Normal muscle bulk & tone. No noticeable tremor or seizure. No pronator drift.              Deltoid	Biceps	Triceps	   R	5	5	5	5		  L	5	5	5	5	    	H-Flex	H-Ext	K-Flex	K-Ext	D-Flex	P-Flex  R	5	5	5	5	severely limited due to pain		   L	5	5	5	5	severely limited due to pain			     Sensation: Reduced sensation below the knees bilaterally to LT and PP, 30% as compared with above the knee. Vibratory sense diminished in the LE below the knee. Position sense intact bilaterally.     Reflexes:              Biceps(C5)       BR(C6)     Triceps(C7)               Patellar(L4)    Achilles(S1)    Plantar Resp  R	2	          2	             2		        0		    not tested due to pain  L	2	          2	             2		        0		    not tested due to pain    Coordination: No dysmetria to FTN    Gait: Not tested due to pain    LABORATORY:  CBC                       13.3   7.75  )-----------( 288      ( 27 Jun 2022 17:57 )             42.4     Chem 06-27    140  |  103  |  8   ----------------------------<  115<H>  3.8   |  25  |  0.57    Ca    10.0      27 Jun 2022 17:57  Phos  4.4     06-27  Mg     2.0     06-27    TPro  7.7  /  Alb  4.4  /  TBili  0.6  /  DBili  x   /  AST  12  /  ALT  27  /  AlkPhos  62  06-27    LFTs LIVER FUNCTIONS - ( 27 Jun 2022 17:57 )  Alb: 4.4 g/dL / Pro: 7.7 g/dL / ALK PHOS: 62 U/L / ALT: 27 U/L / AST: 12 U/L / GGT: x           Coagulopathy PT/INR - ( 27 Jun 2022 17:57 )   PT: 13.4 sec;   INR: 1.15 ratio         PTT - ( 27 Jun 2022 17:57 )  PTT:26.5 sec      STUDIES & IMAGING:    Radiology (XR, CT, MR, U/S, TTE/RUPALI):     HPI:  Estela Clark is a 21 year old RH female with a past medical history of DM and HLD who presents to the ED for continued lower extremity pain and paresthesias. At baseline, the patient is ambulatory without assistive devices and is oriented to person, place, time. Patient stated that she has been having about a month long period of LE numbness and paresthesias which have progressed over the last several day to becoming very painful especially on the soles of her feet. She stated that the pain is a burning pain on the soles of her feet and has become so painful that she cannot lift her legs or put pressure on her soles. She noted that she would now have to crawl in order to ambulate because the pain was so intense. She had a workup for GBS when she presented on June 21st which was negative and she was discharged. She has followed up with neurologist Dr. Isbell who had increased her gabapentin and she currently takes 300mg bid. Occasionally the patient would also get finger tip numbness on her UE. Noted that after her LP she had a headache and some mild difficult hearing which has since resolved. Currently with no headaches, hearing loss, visual changes, dysphagia, dysarthria, weakness. No recent illnesses or sick contacts. Notes that she is not currently taking any DM medications due to her A1c improving. Noted she was taking pyridoxine for 6 days but had since stopped. Has been taking B12 supplementation.    NIHSS: 1   MRS: 0      REVIEW OF SYSTEMS    A 10-system ROS was performed and is negative except for those items noted above and/or in the HPI.    PAST MEDICAL & SURGICAL HISTORY:  DM (diabetes mellitus), type 2    No significant past surgical history    FAMILY HISTORY:  Family history of "arthritis"    SOCIAL HISTORY:   T/E/D: no reported tobacco, alcohol, illicit drugs  Lives with family    MEDICATIONS (HOME):  Home Medications:    MEDICATIONS  (STANDING):  nortriptyline 10 milliGRAM(s) Oral Once    MEDICATIONS  (PRN):    ALLERGIES/INTOLERANCES:  Allergies  No Known Allergies    Intolerances    VITALS & EXAMINATION:  Vital Signs Last 24 Hrs  T(C): 36.7 (27 Jun 2022 22:20), Max: 36.8 (27 Jun 2022 17:23)  T(F): 98.1 (27 Jun 2022 22:20), Max: 98.2 (27 Jun 2022 17:23)  HR: 101 (27 Jun 2022 22:20) (101 - 117)  BP: 108/73 (27 Jun 2022 22:20) (108/73 - 112/77)  BP(mean): --  RR: 20 (27 Jun 2022 22:20) (20 - 20)  SpO2: 99% (27 Jun 2022 22:20) (98% - 99%)    General:  Constitutional: Appears stated age, in no apparent distress including pain  Head: Normocephalic & atraumatic.  CV: Peripheral pulses palpable, no edema  Eyes: Fundoscopy not well visualized    Neurological (>12):  MS: Awake, alert, oriented to person, place, situation, time. Normal affect. Follows all commands.    Language: Speech is clear, fluent.    CNs: PERRL (R = 4mm, L = 4mm). VF intact in all 4 quadrants. EOMI no nystagmus. V1-3 intact to LT. No facial asymmetry b/l. Symmetric palate elevation in midline.Shoulder shrug intact b/l. Tongue midline, normal movements, no atrophy.    Motor: Normal muscle bulk & tone. No noticeable tremor or seizure. No pronator drift.              Deltoid	Biceps	Triceps	   R	5	5	5	5		  L	5	5	5	5	    	H-Flex	H-Ext	K-Flex	K-Ext	D-Flex	P-Flex  R	5	5	5	5	severely limited due to pain		   L	5	5	5	5	severely limited due to pain			     Sensation: Reduced sensation below the knees bilaterally to LT and PP, 30% as compared with above the knee. Vibratory sense diminished in the LE below the knee. Position sense intact bilaterally.     Reflexes:              Biceps(C5)       BR(C6)     Triceps(C7)               Patellar(L4)    Achilles(S1)    Plantar Resp  R	2	          2	             2		        0		    not tested due to pain  L	2	          2	             2		        0		    not tested due to pain    Coordination: No dysmetria to FTN    Gait: Not tested due to pain    LABORATORY:  CBC                       13.3   7.75  )-----------( 288      ( 27 Jun 2022 17:57 )             42.4     Chem 06-27    140  |  103  |  8   ----------------------------<  115<H>  3.8   |  25  |  0.57    Ca    10.0      27 Jun 2022 17:57  Phos  4.4     06-27  Mg     2.0     06-27    TPro  7.7  /  Alb  4.4  /  TBili  0.6  /  DBili  x   /  AST  12  /  ALT  27  /  AlkPhos  62  06-27    LFTs LIVER FUNCTIONS - ( 27 Jun 2022 17:57 )  Alb: 4.4 g/dL / Pro: 7.7 g/dL / ALK PHOS: 62 U/L / ALT: 27 U/L / AST: 12 U/L / GGT: x           Coagulopathy PT/INR - ( 27 Jun 2022 17:57 )   PT: 13.4 sec;   INR: 1.15 ratio         PTT - ( 27 Jun 2022 17:57 )  PTT:26.5 sec      STUDIES & IMAGING:    Radiology (XR, CT, MR, U/S, TTE/RUPALI):

## 2022-06-28 NOTE — H&P ADULT - NSICDXPASTMEDICALHX_GEN_ALL_CORE_FT
PAST MEDICAL HISTORY:  DM (diabetes mellitus), type 2 a1c 4.8. Currently off medications    High cholesterol currently off medications

## 2022-06-28 NOTE — H&P ADULT - NSHPPHYSICALEXAM_GEN_ALL_CORE
VITALS:   T(C): 36.6 (06-28-22 @ 12:17), Max: 37 (06-28-22 @ 02:51)  HR: 100 (06-28-22 @ 14:24) (94 - 117)  BP: 127/88 (06-28-22 @ 14:24) (108/73 - 131/88)  RR: 18 (06-28-22 @ 12:17) (18 - 20)  SpO2: 100% (06-28-22 @ 14:24) (98% - 100%)    GENERAL: NAD, lying in bed comfortably.   HEAD:  Atraumatic, normocephalic  EYES: EOMI, PERRLA, conjunctiva and sclera clear  ENT: Moist mucous membranes  NECK: Supple, no JVD  HEART: Regular rate and rhythm, no murmurs, rubs, or gallops  LUNGS: Unlabored respirations.  Clear to auscultation bilaterally, no crackles, wheezing, or rhonchi  ABDOMEN: Soft, nontender, nondistended, +BS  EXTREMITIES: 2+ peripheral pulses bilaterally. No pitting edema. Lower extremities painful to palpation.   NERVOUS SYSTEM:  A&Ox3, no focal deficits. Witnessed patient ambulate with PT. Slow movements, normal gait.   SKIN: No rashes or lesions

## 2022-06-28 NOTE — OCCUPATIONAL THERAPY INITIAL EVALUATION ADULT - DIAGNOSIS, OT EVAL
Pt demonstrated deficits with strength, endurance, sensation, pain impacting pt's ability to participate in functional activities and ADLs.

## 2022-06-28 NOTE — OCCUPATIONAL THERAPY INITIAL EVALUATION ADULT - RANGE OF MOTION EXAMINATION, LOWER EXTREMITY
B/L plantar/dorsiflexion AROM limited- AAROM WFL/bilateral LE Active ROM was WFL  (within functional limits)

## 2022-06-29 LAB
ACE SERPL-CCNC: 18 U/L — SIGNIFICANT CHANGE UP (ref 14–82)
ALBUMIN SERPL ELPH-MCNC: 3.9 G/DL — SIGNIFICANT CHANGE UP (ref 3.3–5)
ALP SERPL-CCNC: 57 U/L — SIGNIFICANT CHANGE UP (ref 40–120)
ALT FLD-CCNC: 25 U/L — SIGNIFICANT CHANGE UP (ref 10–45)
ANA TITR SER: NEGATIVE — SIGNIFICANT CHANGE UP
ANION GAP SERPL CALC-SCNC: 12 MMOL/L — SIGNIFICANT CHANGE UP (ref 5–17)
AST SERPL-CCNC: 12 U/L — SIGNIFICANT CHANGE UP (ref 10–40)
AUTO DIFF PNL BLD: NEGATIVE — SIGNIFICANT CHANGE UP
BILIRUB SERPL-MCNC: 0.5 MG/DL — SIGNIFICANT CHANGE UP (ref 0.2–1.2)
BUN SERPL-MCNC: 8 MG/DL — SIGNIFICANT CHANGE UP (ref 7–23)
C-ANCA SER-ACNC: NEGATIVE — SIGNIFICANT CHANGE UP
CALCIUM SERPL-MCNC: 9.4 MG/DL — SIGNIFICANT CHANGE UP (ref 8.4–10.5)
CHLORIDE SERPL-SCNC: 104 MMOL/L — SIGNIFICANT CHANGE UP (ref 96–108)
CO2 SERPL-SCNC: 21 MMOL/L — LOW (ref 22–31)
CREAT SERPL-MCNC: 0.47 MG/DL — LOW (ref 0.5–1.3)
EGFR: 138 ML/MIN/1.73M2 — SIGNIFICANT CHANGE UP
GLUCOSE SERPL-MCNC: 89 MG/DL — SIGNIFICANT CHANGE UP (ref 70–99)
HCT VFR BLD CALC: 39.7 % — SIGNIFICANT CHANGE UP (ref 34.5–45)
HGB BLD-MCNC: 12.5 G/DL — SIGNIFICANT CHANGE UP (ref 11.5–15.5)
MAGNESIUM SERPL-MCNC: 2 MG/DL — SIGNIFICANT CHANGE UP (ref 1.6–2.6)
MCHC RBC-ENTMCNC: 26.3 PG — LOW (ref 27–34)
MCHC RBC-ENTMCNC: 31.5 GM/DL — LOW (ref 32–36)
MCV RBC AUTO: 83.6 FL — SIGNIFICANT CHANGE UP (ref 80–100)
NRBC # BLD: 0 /100 WBCS — SIGNIFICANT CHANGE UP (ref 0–0)
P-ANCA SER-ACNC: NEGATIVE — SIGNIFICANT CHANGE UP
PHOSPHATE SERPL-MCNC: 4.4 MG/DL — SIGNIFICANT CHANGE UP (ref 2.5–4.5)
PLATELET # BLD AUTO: 268 K/UL — SIGNIFICANT CHANGE UP (ref 150–400)
POTASSIUM SERPL-MCNC: 3.6 MMOL/L — SIGNIFICANT CHANGE UP (ref 3.5–5.3)
POTASSIUM SERPL-SCNC: 3.6 MMOL/L — SIGNIFICANT CHANGE UP (ref 3.5–5.3)
PROT SERPL-MCNC: 7.2 G/DL — SIGNIFICANT CHANGE UP (ref 6–8.3)
RBC # BLD: 4.75 M/UL — SIGNIFICANT CHANGE UP (ref 3.8–5.2)
RBC # FLD: 18 % — HIGH (ref 10.3–14.5)
SODIUM SERPL-SCNC: 137 MMOL/L — SIGNIFICANT CHANGE UP (ref 135–145)
WBC # BLD: 7.55 K/UL — SIGNIFICANT CHANGE UP (ref 3.8–10.5)
WBC # FLD AUTO: 7.55 K/UL — SIGNIFICANT CHANGE UP (ref 3.8–10.5)

## 2022-06-29 RX ORDER — GABAPENTIN 400 MG/1
600 CAPSULE ORAL THREE TIMES A DAY
Refills: 0 | Status: DISCONTINUED | OUTPATIENT
Start: 2022-06-29 | End: 2022-06-30

## 2022-06-29 RX ORDER — ACETAMINOPHEN 500 MG
650 TABLET ORAL ONCE
Refills: 0 | Status: COMPLETED | OUTPATIENT
Start: 2022-06-29 | End: 2022-06-29

## 2022-06-29 RX ADMIN — Medication 400 MILLIGRAM(S): at 22:26

## 2022-06-29 RX ADMIN — Medication 400 MILLIGRAM(S): at 13:45

## 2022-06-29 RX ADMIN — Medication 650 MILLIGRAM(S): at 07:32

## 2022-06-29 RX ADMIN — Medication 650 MILLIGRAM(S): at 02:27

## 2022-06-29 RX ADMIN — GABAPENTIN 300 MILLIGRAM(S): 400 CAPSULE ORAL at 05:13

## 2022-06-29 RX ADMIN — Medication 400 MILLIGRAM(S): at 12:42

## 2022-06-29 RX ADMIN — Medication 100 MILLIGRAM(S): at 12:41

## 2022-06-29 RX ADMIN — GABAPENTIN 600 MILLIGRAM(S): 400 CAPSULE ORAL at 22:26

## 2022-06-29 RX ADMIN — Medication 1 MILLIGRAM(S): at 12:42

## 2022-06-29 RX ADMIN — Medication 650 MILLIGRAM(S): at 06:41

## 2022-06-29 RX ADMIN — Medication 1 TABLET(S): at 12:41

## 2022-06-29 RX ADMIN — Medication 100 MILLIGRAM(S): at 12:42

## 2022-06-29 RX ADMIN — Medication 400 MILLIGRAM(S): at 23:26

## 2022-06-29 RX ADMIN — PREGABALIN 1000 MICROGRAM(S): 225 CAPSULE ORAL at 12:44

## 2022-06-29 RX ADMIN — GABAPENTIN 300 MILLIGRAM(S): 400 CAPSULE ORAL at 12:43

## 2022-06-29 RX ADMIN — NORTRIPTYLINE HYDROCHLORIDE 10 MILLIGRAM(S): 10 CAPSULE ORAL at 12:42

## 2022-06-29 RX ADMIN — Medication 400 MILLIGRAM(S): at 04:12

## 2022-06-29 RX ADMIN — Medication 650 MILLIGRAM(S): at 03:27

## 2022-06-29 RX ADMIN — Medication 400 MILLIGRAM(S): at 05:12

## 2022-06-29 RX ADMIN — ENOXAPARIN SODIUM 40 MILLIGRAM(S): 100 INJECTION SUBCUTANEOUS at 17:14

## 2022-06-29 NOTE — PROGRESS NOTE ADULT - SUBJECTIVE AND OBJECTIVE BOX
PROGRESS NOTE:     Patient is a 21y old  Female who presents with a chief complaint of Neuropathy (28 Jun 2022 14:22)      SUBJECTIVE / OVERNIGHT EVENTS:    No acute events overnight. Patient examined at bedside with no acute complaints.     Pain:  Bowel Movements:  Urination:  OOB:  PT:    REVIEW OF SYSTEMS:    CONSTITUTIONAL: No weakness, fevers or chills  EYES/ENT: No visual changes;  No vertigo or throat pain   NECK: No pain or stiffness  RESPIRATORY: No cough, wheezing, hemoptysis; No shortness of breath  CARDIOVASCULAR: No chest pain or palpitations  GASTROINTESTINAL: No abdominal or epigastric pain. No nausea, vomiting, or hematemesis; No diarrhea or constipation. No melena or hematochezia.  GENITOURINARY: No dysuria, frequency or hematuria  NEUROLOGICAL: No numbness or weakness  SKIN: No itching, rashes      MEDICATIONS  (STANDING):  cyanocobalamin 1000 MICROGram(s) Oral daily  enoxaparin Injectable 40 milliGRAM(s) SubCutaneous every 24 hours  folic acid 1 milliGRAM(s) Oral daily  gabapentin 300 milliGRAM(s) Oral three times a day  influenza   Vaccine 0.5 milliLiter(s) IntraMuscular once  multivitamin/minerals 1 Tablet(s) Oral daily  nortriptyline 10 milliGRAM(s) Oral daily  pyridoxine 100 milliGRAM(s) Oral daily  thiamine 100 milliGRAM(s) Oral daily    MEDICATIONS  (PRN):  acetaminophen     Tablet .. 650 milliGRAM(s) Oral every 6 hours PRN Temp greater or equal to 38C (100.4F), Mild Pain (1 - 3)  ibuprofen  Tablet. 400 milliGRAM(s) Oral three times a day PRN Moderate Pain (4 - 6)  melatonin 3 milliGRAM(s) Oral at bedtime PRN Insomnia      CAPILLARY BLOOD GLUCOSE      POCT Blood Glucose.: 82 mg/dL (28 Jun 2022 10:24)    I&O's Summary      VITALS:   T(C): 36.6 (06-29-22 @ 04:38), Max: 36.7 (06-28-22 @ 11:11)  HR: 100 (06-29-22 @ 04:38) (94 - 105)  BP: 124/86 (06-29-22 @ 04:38) (116/81 - 131/88)  RR: 18 (06-29-22 @ 04:38) (18 - 20)  SpO2: 98% (06-29-22 @ 04:38) (98% - 100%)    GENERAL: NAD, lying in bed comfortably  HEAD:  Atraumatic, normocephalic  EYES: EOMI, PERRLA, conjunctiva and sclera clear  ENT: Moist mucous membranes  NECK: Supple, no JVD  HEART: Regular rate and rhythm, no murmurs, rubs, or gallops  LUNGS: Unlabored respirations.  Clear to auscultation bilaterally, no crackles, wheezing, or rhonchi  ABDOMEN: Soft, nontender, nondistended, +BS  EXTREMITIES: 2+ peripheral pulses bilaterally. No clubbing, cyanosis, or edema  NERVOUS SYSTEM:  A&Ox3, no focal deficits   SKIN: No rashes or lesions    LABS:                        13.3   7.75  )-----------( 288      ( 27 Jun 2022 17:57 )             42.4     06-27    140  |  103  |  8   ----------------------------<  115<H>  3.8   |  25  |  0.57    Ca    10.0      27 Jun 2022 17:57  Phos  4.4     06-27  Mg     2.0     06-27    TPro  6.7  /  Alb  4.0  /  TBili  0.4  /  DBili  0.1  /  AST  13  /  ALT  28  /  AlkPhos  60  06-28    PT/INR - ( 27 Jun 2022 17:57 )   PT: 13.4 sec;   INR: 1.15 ratio         PTT - ( 27 Jun 2022 17:57 )  PTT:26.5 sec            RADIOLOGY & ADDITIONAL TESTS:  Results Reviewed:   Imaging Personally Reviewed:  Electrocardiogram Personally Reviewed:    COORDINATION OF CARE:  Care Discussed with Consultants/Other Providers [Y/N]:  Prior or Outpatient Records Reviewed [Y/N]:   PROGRESS NOTE:     Patient is a 21y old  Female who presents with a chief complaint of Neuropathy (28 Jun 2022 14:22)      SUBJECTIVE / OVERNIGHT EVENTS:    Patient examined at bedside. Overnight, pain was uncontrolled with 10/10 severity, neuropathic in nature. Patient was administered 400 mg ibuprofen and 650 mg acetaminophen, with little effect. Patient slept poorly.     Pain: 10/10     REVIEW OF SYSTEMS:    CONSTITUTIONAL: Severe pain   EYES/ENT: No visual changes;  No vertigo or throat pain   NECK: No pain or stiffness  RESPIRATORY: No cough, wheezing, hemoptysis; No shortness of breath  CARDIOVASCULAR: No chest pain or palpitations  GASTROINTESTINAL: No abdominal or epigastric pain. No nausea, vomiting, or hematemesis; No diarrhea or constipation. No melena or hematochezia.  GENITOURINARY: No dysuria, frequency or hematuria  NEUROLOGICAL: See HPI   SKIN: No itching, rashes      MEDICATIONS  (STANDING):  cyanocobalamin 1000 MICROGram(s) Oral daily  enoxaparin Injectable 40 milliGRAM(s) SubCutaneous every 24 hours  folic acid 1 milliGRAM(s) Oral daily  gabapentin 300 milliGRAM(s) Oral three times a day  influenza   Vaccine 0.5 milliLiter(s) IntraMuscular once  multivitamin/minerals 1 Tablet(s) Oral daily  nortriptyline 10 milliGRAM(s) Oral daily  pyridoxine 100 milliGRAM(s) Oral daily  thiamine 100 milliGRAM(s) Oral daily    MEDICATIONS  (PRN):  acetaminophen     Tablet .. 650 milliGRAM(s) Oral every 6 hours PRN Temp greater or equal to 38C (100.4F), Mild Pain (1 - 3)  ibuprofen  Tablet. 400 milliGRAM(s) Oral three times a day PRN Moderate Pain (4 - 6)  melatonin 3 milliGRAM(s) Oral at bedtime PRN Insomnia      CAPILLARY BLOOD GLUCOSE      POCT Blood Glucose.: 82 mg/dL (28 Jun 2022 10:24)    I&O's Summary      VITALS:   T(C): 36.6 (06-29-22 @ 04:38), Max: 36.7 (06-28-22 @ 11:11)  HR: 100 (06-29-22 @ 04:38) (94 - 105)  BP: 124/86 (06-29-22 @ 04:38) (116/81 - 131/88)  RR: 18 (06-29-22 @ 04:38) (18 - 20)  SpO2: 98% (06-29-22 @ 04:38) (98% - 100%)    GENERAL: NAD, lying in bed comfortably  HEAD:  Atraumatic, normocephalic  EYES: EOMI, PERRLA, conjunctiva and sclera clear  ENT: Moist mucous membranes  NECK: Supple, no JVD  HEART: Regular rate and rhythm, no murmurs, rubs, or gallops  LUNGS: Unlabored respirations.  Clear to auscultation bilaterally, no crackles, wheezing, or rhonchi  ABDOMEN: Soft, nontender, nondistended, +BS  EXTREMITIES: 2+ peripheral pulses bilaterally. No clubbing, cyanosis, or edema  NERVOUS SYSTEM:  A&Ox3, no focal deficits   SKIN: No rashes or lesions    LABS:                        13.3   7.75  )-----------( 288      ( 27 Jun 2022 17:57 )             42.4     06-27    140  |  103  |  8   ----------------------------<  115<H>  3.8   |  25  |  0.57    Ca    10.0      27 Jun 2022 17:57  Phos  4.4     06-27  Mg     2.0     06-27    TPro  6.7  /  Alb  4.0  /  TBili  0.4  /  DBili  0.1  /  AST  13  /  ALT  28  /  AlkPhos  60  06-28    PT/INR - ( 27 Jun 2022 17:57 )   PT: 13.4 sec;   INR: 1.15 ratio         PTT - ( 27 Jun 2022 17:57 )  PTT:26.5 sec

## 2022-06-29 NOTE — PROGRESS NOTE ADULT - PROBLEM SELECTOR PLAN 1
- Previous CT brain unremarkable. Previous LP unremarkable.   - Neurology consulted, appreciate recs.   - Increase Gabapentin to 300 mg TID, per neurology.   - Follow-up pending laboratory studies.   - Folate level < 2.0. Will supplement. - Previous CT brain unremarkable. Previous LP unremarkable.   - Neurology consulted, appreciate recs.   - Increase Gabapentin to 600 mg TID.   - Ibuprofen 400 mg PRN for breakthrough pain.   - Follow-up pending laboratory studies.   - Folate level < 2.0. Will supplement.

## 2022-06-29 NOTE — PROGRESS NOTE ADULT - PROBLEM SELECTOR PLAN 3
DVT: Lovenox   Diet: regular   Dispo: pending PT eval DVT: Lovenox   Diet: regular   Dispo: pending clinical progress and PT eval

## 2022-06-30 LAB
% ALBUMIN: 53.3 % — SIGNIFICANT CHANGE UP
% ALPHA 1: 5.8 % — SIGNIFICANT CHANGE UP
% ALPHA 2: 12.9 % — SIGNIFICANT CHANGE UP
% BETA: 13.3 % — SIGNIFICANT CHANGE UP
% GAMMA: 14.7 % — SIGNIFICANT CHANGE UP
ALBUMIN SERPL ELPH-MCNC: 3.6 G/DL — SIGNIFICANT CHANGE UP (ref 3.6–5.5)
ALBUMIN SERPL ELPH-MCNC: 3.7 G/DL — SIGNIFICANT CHANGE UP (ref 3.3–5)
ALBUMIN/GLOB SERPL ELPH: 1.1 RATIO — SIGNIFICANT CHANGE UP
ALP SERPL-CCNC: 55 U/L — SIGNIFICANT CHANGE UP (ref 40–120)
ALPHA1 GLOB SERPL ELPH-MCNC: 0.4 G/DL — SIGNIFICANT CHANGE UP (ref 0.1–0.4)
ALPHA2 GLOB SERPL ELPH-MCNC: 0.9 G/DL — SIGNIFICANT CHANGE UP (ref 0.5–1)
ALT FLD-CCNC: 26 U/L — SIGNIFICANT CHANGE UP (ref 10–45)
ANION GAP SERPL CALC-SCNC: 11 MMOL/L — SIGNIFICANT CHANGE UP (ref 5–17)
AST SERPL-CCNC: 15 U/L — SIGNIFICANT CHANGE UP (ref 10–40)
B BURGDOR C6 AB SER-ACNC: NEGATIVE — SIGNIFICANT CHANGE UP
B BURGDOR IGG+IGM SER-ACNC: 0.14 INDEX — SIGNIFICANT CHANGE UP (ref 0.01–0.89)
B-GLOBULIN SERPL ELPH-MCNC: 0.9 G/DL — SIGNIFICANT CHANGE UP (ref 0.5–1)
BILIRUB SERPL-MCNC: 0.5 MG/DL — SIGNIFICANT CHANGE UP (ref 0.2–1.2)
BUN SERPL-MCNC: 7 MG/DL — SIGNIFICANT CHANGE UP (ref 7–23)
CALCIUM SERPL-MCNC: 9.3 MG/DL — SIGNIFICANT CHANGE UP (ref 8.4–10.5)
CHLORIDE SERPL-SCNC: 103 MMOL/L — SIGNIFICANT CHANGE UP (ref 96–108)
CO2 SERPL-SCNC: 23 MMOL/L — SIGNIFICANT CHANGE UP (ref 22–31)
COPPER SERPL-MCNC: 117 UG/DL — SIGNIFICANT CHANGE UP (ref 80–158)
CREAT SERPL-MCNC: 0.46 MG/DL — LOW (ref 0.5–1.3)
EGFR: 139 ML/MIN/1.73M2 — SIGNIFICANT CHANGE UP
GAMMA GLOBULIN: 1 G/DL — SIGNIFICANT CHANGE UP (ref 0.6–1.6)
GLUCOSE SERPL-MCNC: 81 MG/DL — SIGNIFICANT CHANGE UP (ref 70–99)
HCT VFR BLD CALC: 39.1 % — SIGNIFICANT CHANGE UP (ref 34.5–45)
HGB BLD-MCNC: 12.4 G/DL — SIGNIFICANT CHANGE UP (ref 11.5–15.5)
MAGNESIUM SERPL-MCNC: 2.1 MG/DL — SIGNIFICANT CHANGE UP (ref 1.6–2.6)
MCHC RBC-ENTMCNC: 26.8 PG — LOW (ref 27–34)
MCHC RBC-ENTMCNC: 31.7 GM/DL — LOW (ref 32–36)
MCV RBC AUTO: 84.4 FL — SIGNIFICANT CHANGE UP (ref 80–100)
NRBC # BLD: 0 /100 WBCS — SIGNIFICANT CHANGE UP (ref 0–0)
PHOSPHATE SERPL-MCNC: 4.5 MG/DL — SIGNIFICANT CHANGE UP (ref 2.5–4.5)
PLATELET # BLD AUTO: 272 K/UL — SIGNIFICANT CHANGE UP (ref 150–400)
POTASSIUM SERPL-MCNC: 3.5 MMOL/L — SIGNIFICANT CHANGE UP (ref 3.5–5.3)
POTASSIUM SERPL-SCNC: 3.5 MMOL/L — SIGNIFICANT CHANGE UP (ref 3.5–5.3)
PROT PATTERN SERPL ELPH-IMP: SIGNIFICANT CHANGE UP
PROT SERPL-MCNC: 6.9 G/DL — SIGNIFICANT CHANGE UP (ref 6–8.3)
RBC # BLD: 4.63 M/UL — SIGNIFICANT CHANGE UP (ref 3.8–5.2)
RBC # FLD: 17.9 % — HIGH (ref 10.3–14.5)
SODIUM SERPL-SCNC: 137 MMOL/L — SIGNIFICANT CHANGE UP (ref 135–145)
WBC # BLD: 7.46 K/UL — SIGNIFICANT CHANGE UP (ref 3.8–10.5)
WBC # FLD AUTO: 7.46 K/UL — SIGNIFICANT CHANGE UP (ref 3.8–10.5)

## 2022-06-30 PROCEDURE — 99222 1ST HOSP IP/OBS MODERATE 55: CPT

## 2022-06-30 RX ORDER — GABAPENTIN 400 MG/1
600 CAPSULE ORAL
Refills: 0 | Status: DISCONTINUED | OUTPATIENT
Start: 2022-06-30 | End: 2022-07-05

## 2022-06-30 RX ORDER — ACETAMINOPHEN 500 MG
1000 TABLET ORAL ONCE
Refills: 0 | Status: COMPLETED | OUTPATIENT
Start: 2022-06-30 | End: 2022-06-30

## 2022-06-30 RX ADMIN — Medication 100 MILLIGRAM(S): at 12:26

## 2022-06-30 RX ADMIN — Medication 75 MILLIGRAM(S): at 17:23

## 2022-06-30 RX ADMIN — Medication 400 MILLIGRAM(S): at 05:52

## 2022-06-30 RX ADMIN — Medication 1 MILLIGRAM(S): at 12:27

## 2022-06-30 RX ADMIN — Medication 400 MILLIGRAM(S): at 21:47

## 2022-06-30 RX ADMIN — GABAPENTIN 600 MILLIGRAM(S): 400 CAPSULE ORAL at 13:26

## 2022-06-30 RX ADMIN — Medication 400 MILLIGRAM(S): at 06:45

## 2022-06-30 RX ADMIN — Medication 1 TABLET(S): at 12:27

## 2022-06-30 RX ADMIN — PREGABALIN 1000 MICROGRAM(S): 225 CAPSULE ORAL at 12:27

## 2022-06-30 RX ADMIN — Medication 650 MILLIGRAM(S): at 03:31

## 2022-06-30 RX ADMIN — GABAPENTIN 600 MILLIGRAM(S): 400 CAPSULE ORAL at 05:52

## 2022-06-30 RX ADMIN — ENOXAPARIN SODIUM 40 MILLIGRAM(S): 100 INJECTION SUBCUTANEOUS at 17:25

## 2022-06-30 RX ADMIN — Medication 650 MILLIGRAM(S): at 04:30

## 2022-06-30 RX ADMIN — Medication 100 MILLIGRAM(S): at 12:27

## 2022-06-30 RX ADMIN — NORTRIPTYLINE HYDROCHLORIDE 10 MILLIGRAM(S): 10 CAPSULE ORAL at 12:27

## 2022-06-30 RX ADMIN — GABAPENTIN 600 MILLIGRAM(S): 400 CAPSULE ORAL at 21:47

## 2022-06-30 NOTE — CONSULT NOTE ADULT - SUBJECTIVE AND OBJECTIVE BOX
Chief Complaint:  Patient is a 21y old  Female who presents with a chief complaint of Neuropathy     HPI:  20 yo F with PMHx of DM and HDL no longer on medication presents with bilateral foot pain and numbing for 1 month. Patient was seen by outpatient neurologist who performed EMG, results unremarkable, and sent patient to the ED on 7/21/2022 for workup to r/o GBS. CT brain w/o contrast and LP were both done during that ED visit, and both were unremarkable. Patient reports pain worsened progressively over the past 1 week, with swelling of the feet and inability to ambulate. Patient reports pain worsens upon movement and feels as if her feet are "asleep". Pain is 8/10 in severity.   Patient at home takes Gabapentin 300 mg BID, and was prescribed vitamins B1, B6, B12, and multivitamin supplements. Patient was previously on atorvastatin and metformin, but discontinued metformin due to diarrhea and changed to steglatro. Both atorvastatin and metformin were discontinued due to improvement in a1c and lipids.   Neurology was consulted and recommended admission with laboratory workup, including workup for vitamin deficiency.       Current Pain Score: 1/10    Current out- patient pain regimen: neurontin 300 mg TID    Out Patient Pain Management provider: none    [facility] Prescription Monitoring Program: Reference #[id number]    PAST MEDICAL & SURGICAL HISTORY:  DM (diabetes mellitus), type 2  a1c 4.8. Currently off medications    High cholesterol  currently off medications    No significant past surgical history    SOCIAL HISTORY:  Tobacco Use: denies  Alcohol Use: denies  Recreational Marijuana: occasional  Illicit Drug Use: denies    Opioid Risk Tool (ORT-OUD) Score: low    Allergies    No Known Allergies    Intolerances    None known    REVIEW OF SYSTEMS:  CONSTITUTIONAL: No fever, weight loss, fatigue, falls  NEURO: No headaches, memory loss, loss of strength, tremors, dizziness or blurred vision; + loss of dexterity; + paresthesias B/L feet  RESP: No shortness of breath, cough  CV: No chest pain, palpitations  GI: No abdominal pain, nausea, vomiting, diarrhea, constipation   : No urinary incontinence/retention, dysuria  MSK: No joint pain; No neck or back pain; no progressive upper or lower motor strength weakness; no saddle anesthesia   SKIN: No itching, burning, rashes   PSYCHIATRIC: No depression, anxiety, mood swings; + difficulty sleeping due to severe increase in pain at night      MEDICATIONS  (STANDING):  acetaminophen   IVPB .. 1000 milliGRAM(s) IV Intermittent once  cyanocobalamin 1000 MICROGram(s) Oral daily  enoxaparin Injectable 40 milliGRAM(s) SubCutaneous every 24 hours  folic acid 1 milliGRAM(s) Oral daily  gabapentin 600 milliGRAM(s) Oral three times a day  influenza   Vaccine 0.5 milliLiter(s) IntraMuscular once  multivitamin/minerals 1 Tablet(s) Oral daily  nortriptyline 10 milliGRAM(s) Oral daily  pyridoxine 100 milliGRAM(s) Oral daily  thiamine 100 milliGRAM(s) Oral daily    MEDICATIONS  (PRN):  acetaminophen     Tablet .. 650 milliGRAM(s) Oral every 6 hours PRN Temp greater or equal to 38C (100.4F), Mild Pain (1 - 3)  ibuprofen  Tablet. 400 milliGRAM(s) Oral three times a day PRN Moderate Pain (4 - 6)  melatonin 3 milliGRAM(s) Oral at bedtime PRN Insomnia      PHYSICAL EXAM  GENERAL: seen at bedside; NAD; well developed, well groomed; no signs of toxicity  HEENT: head atraumatic, normocephalic; anicteric; speech clear and fluent  NEURO: A + O X 3; good concentration; Cranial Nerves II- XII intact; SILT but + allodynia and hyperesthesias of B/L feet and B/L fingers to DIP joints with loss of dexterity  RESPIRATORY: lungs clear to auscultation B/L; no rales or rhonchi; chest expansion equal  CARDIAC: regular cardiac rhythm; S1 S2; no JVD  GI: abdomen soft, non distended; + bowel sounds; last BM yesterday  : no CVA tenderness; voiding  EXTREMITIES/ PV: OROSCO; 2+ peripheral pulses; no cyanosis, edema or clubbing  MUSCULOSKELETAL: motor strength 5/5 B/L lower extremities; independent ambulator  SKIN: no rashes, lesions    PSYCH: affect normal, mood sad; good eye contact; no signs of depression or anxiety    Vital Signs:  T(C): 36.6 (06-30-22 @ 14:30)  HR: 81 (06-30-22 @ 14:30)  BP: 145/83 (06-30-22 @ 14:30)  RR: 18 (06-30-22 @ 14:30)  SpO2: 99% (06-30-22 @ 14:30)    Pertinent labs/radiology:                        12.4   7.46  )-----------( 272      ( 30 Jun 2022 07:21 )             39.1   06-30    137  |  103  |  7   ----------------------------<  81  3.5   |  23  |  0.46<L>    Ca    9.3      30 Jun 2022 07:20  Phos  4.5     06-30  Mg     2.1     06-30    TPro  6.9  /  Alb  3.7  /  TBili  0.5  /  DBili  x   /  AST  15  /  ALT  26  /  AlkPhos  55  06-30    A1c 4.7

## 2022-06-30 NOTE — PROGRESS NOTE ADULT - PROBLEM SELECTOR PLAN 1
- Previous CT brain unremarkable. Previous LP unremarkable.   - Neurology consulted, appreciate recs.   - Increase Gabapentin to 600 mg TID.   - Ibuprofen 400 mg PRN for breakthrough pain.   - Follow-up pending laboratory studies.   - Folate level < 2.0. Will supplement. - Previous CT brain unremarkable. Previous LP unremarkable.   - Neurology consulted, appreciate recs.   - Increase Gabapentin to 600 mg TID.   - Ibuprofen ordered to be given with gabapentin doses.  - IV tylenol for breatkthrough pain  - Pain management consulted, will f/u recs  - Follow-up pending laboratory studies.   - Folate level < 2.0. Will supplement.

## 2022-06-30 NOTE — PROGRESS NOTE ADULT - SUBJECTIVE AND OBJECTIVE BOX
Jeffry Harrell MD  Internal Medicine PGY-1      PROGRESS NOTE:     Patient is a 21y old  Female who presents with a chief complaint of Neuropathy (29 Jun 2022 06:58)      SUBJECTIVE / OVERNIGHT EVENTS:    No acute events overnight. Patient examined at bedside with no acute complaints.     Pain:  Bowel Movements:  Urination:  OOB:  PT:    REVIEW OF SYSTEMS:    CONSTITUTIONAL: No weakness, fevers or chills  EYES/ENT: No visual changes;  No vertigo or throat pain   NECK: No pain or stiffness  RESPIRATORY: No cough, wheezing, hemoptysis; No shortness of breath  CARDIOVASCULAR: No chest pain or palpitations  GASTROINTESTINAL: No abdominal or epigastric pain. No nausea, vomiting, or hematemesis; No diarrhea or constipation. No melena or hematochezia.  GENITOURINARY: No dysuria, frequency or hematuria  NEUROLOGICAL: No numbness or weakness  SKIN: No itching, rashes      MEDICATIONS  (STANDING):  cyanocobalamin 1000 MICROGram(s) Oral daily  enoxaparin Injectable 40 milliGRAM(s) SubCutaneous every 24 hours  folic acid 1 milliGRAM(s) Oral daily  gabapentin 600 milliGRAM(s) Oral three times a day  influenza   Vaccine 0.5 milliLiter(s) IntraMuscular once  multivitamin/minerals 1 Tablet(s) Oral daily  nortriptyline 10 milliGRAM(s) Oral daily  pyridoxine 100 milliGRAM(s) Oral daily  thiamine 100 milliGRAM(s) Oral daily    MEDICATIONS  (PRN):  acetaminophen     Tablet .. 650 milliGRAM(s) Oral every 6 hours PRN Temp greater or equal to 38C (100.4F), Mild Pain (1 - 3)  ibuprofen  Tablet. 400 milliGRAM(s) Oral three times a day PRN Moderate Pain (4 - 6)  melatonin 3 milliGRAM(s) Oral at bedtime PRN Insomnia      CAPILLARY BLOOD GLUCOSE        I&O's Summary      VITALS:   T(C): 36.3 (06-30-22 @ 04:08), Max: 37.1 (06-29-22 @ 22:54)  HR: 85 (06-30-22 @ 04:08) (83 - 104)  BP: 135/98 (06-30-22 @ 04:08) (120/77 - 135/98)  RR: 18 (06-30-22 @ 04:08) (18 - 20)  SpO2: 100% (06-30-22 @ 04:08) (97% - 100%)    GENERAL: NAD, lying in bed comfortably  HEAD:  Atraumatic, normocephalic  EYES: EOMI, PERRLA, conjunctiva and sclera clear  ENT: Moist mucous membranes  NECK: Supple, no JVD  HEART: Regular rate and rhythm, no murmurs, rubs, or gallops  LUNGS: Unlabored respirations.  Clear to auscultation bilaterally, no crackles, wheezing, or rhonchi  ABDOMEN: Soft, nontender, nondistended, +BS  EXTREMITIES: 2+ peripheral pulses bilaterally. No clubbing, cyanosis, or edema  NERVOUS SYSTEM:  A&Ox3, no focal deficits   SKIN: No rashes or lesions    LABS:                        12.5   7.55  )-----------( 268      ( 29 Jun 2022 07:22 )             39.7     06-29    137  |  104  |  8   ----------------------------<  89  3.6   |  21<L>  |  0.47<L>    Ca    9.4      29 Jun 2022 07:17  Phos  4.4     06-29  Mg     2.0     06-29    TPro  7.2  /  Alb  3.9  /  TBili  0.5  /  DBili  x   /  AST  12  /  ALT  25  /  AlkPhos  57  06-29                RADIOLOGY & ADDITIONAL TESTS:  Results Reviewed:   Imaging Personally Reviewed:  Electrocardiogram Personally Reviewed:    COORDINATION OF CARE:  Care Discussed with Consultants/Other Providers [Y/N]:  Prior or Outpatient Records Reviewed [Y/N]:   Jeffry Harrell MD  Internal Medicine PGY-1      PROGRESS NOTE:     Patient is a 21y old  Female who presents with a chief complaint of Neuropathy (29 Jun 2022 06:58)      SUBJECTIVE / OVERNIGHT EVENTS:    Overnight patient reports pain improved with 600mg TID of gabapentin coadministered with ibuprofen, however she reported breakthrough pain between doses. Also reported feeling tired after gabapentin doses.    REVIEW OF SYSTEMS:    CONSTITUTIONAL: No weakness, fevers or chills  EYES/ENT: No visual changes;  No vertigo or throat pain   NECK: No pain or stiffness  RESPIRATORY: No cough, wheezing, hemoptysis; No shortness of breath  CARDIOVASCULAR: No chest pain or palpitations  GASTROINTESTINAL: No abdominal or epigastric pain. No nausea, vomiting, or hematemesis; No diarrhea or constipation. No melena or hematochezia.  GENITOURINARY: No dysuria, frequency or hematuria  NEUROLOGICAL: Pain present on soles of feet near heels bilaterally, elicited by hip extension L>R  SKIN: No itching, rashes      MEDICATIONS  (STANDING):  cyanocobalamin 1000 MICROGram(s) Oral daily  enoxaparin Injectable 40 milliGRAM(s) SubCutaneous every 24 hours  folic acid 1 milliGRAM(s) Oral daily  gabapentin 600 milliGRAM(s) Oral three times a day  influenza   Vaccine 0.5 milliLiter(s) IntraMuscular once  multivitamin/minerals 1 Tablet(s) Oral daily  nortriptyline 10 milliGRAM(s) Oral daily  pyridoxine 100 milliGRAM(s) Oral daily  thiamine 100 milliGRAM(s) Oral daily    MEDICATIONS  (PRN):  acetaminophen     Tablet .. 650 milliGRAM(s) Oral every 6 hours PRN Temp greater or equal to 38C (100.4F), Mild Pain (1 - 3)  ibuprofen  Tablet. 400 milliGRAM(s) Oral three times a day PRN Moderate Pain (4 - 6)  melatonin 3 milliGRAM(s) Oral at bedtime PRN Insomnia      CAPILLARY BLOOD GLUCOSE        I&O's Summary      VITALS:   T(C): 36.3 (06-30-22 @ 04:08), Max: 37.1 (06-29-22 @ 22:54)  HR: 85 (06-30-22 @ 04:08) (83 - 104)  BP: 135/98 (06-30-22 @ 04:08) (120/77 - 135/98)  RR: 18 (06-30-22 @ 04:08) (18 - 20)  SpO2: 100% (06-30-22 @ 04:08) (97% - 100%)    GENERAL: NAD, lying in bed comfortably  HEAD:  Atraumatic, normocephalic  EYES: EOMI, PERRLA, conjunctiva and sclera clear  ENT: Moist mucous membranes  NECK: Supple, no JVD  HEART: Regular rate and rhythm, no murmurs, rubs, or gallops  LUNGS: Unlabored respirations.  Clear to auscultation bilaterally, no crackles, wheezing, or rhonchi  ABDOMEN: Soft, nontender, nondistended, +BS  EXTREMITIES: 2+ peripheral pulses bilaterally. No clubbing, cyanosis, or edema  NERVOUS SYSTEM:  A&Ox3, no focal deficits   SKIN: No rashes or lesions    LABS:                        12.5   7.55  )-----------( 268      ( 29 Jun 2022 07:22 )             39.7     06-29    137  |  104  |  8   ----------------------------<  89  3.6   |  21<L>  |  0.47<L>    Ca    9.4      29 Jun 2022 07:17  Phos  4.4     06-29  Mg     2.0     06-29    TPro  7.2  /  Alb  3.9  /  TBili  0.5  /  DBili  x   /  AST  12  /  ALT  25  /  AlkPhos  57  06-29                RADIOLOGY & ADDITIONAL TESTS:  Results Reviewed:   Imaging Personally Reviewed:  Electrocardiogram Personally Reviewed:    COORDINATION OF CARE:  Care Discussed with Consultants/Other Providers [Y/N]:  Prior or Outpatient Records Reviewed [Y/N]:

## 2022-07-01 ENCOUNTER — TRANSCRIPTION ENCOUNTER (OUTPATIENT)
Age: 22
End: 2022-07-01

## 2022-07-01 LAB
A-TOCOPHEROL VIT E SERPL-MCNC: 9.2 MG/L — SIGNIFICANT CHANGE UP (ref 5.9–19.4)
ALBUMIN SERPL ELPH-MCNC: 4.3 G/DL — SIGNIFICANT CHANGE UP (ref 3.3–5)
ALP SERPL-CCNC: 64 U/L — SIGNIFICANT CHANGE UP (ref 40–120)
ALT FLD-CCNC: 34 U/L — SIGNIFICANT CHANGE UP (ref 10–45)
ANION GAP SERPL CALC-SCNC: 17 MMOL/L — SIGNIFICANT CHANGE UP (ref 5–17)
ARSENIC SERPL-MCNC: <1 UG/L — SIGNIFICANT CHANGE UP (ref 0–9)
ARSENIC SERPL-MCNC: <1 UG/L — SIGNIFICANT CHANGE UP (ref 0–9)
AST SERPL-CCNC: 21 U/L — SIGNIFICANT CHANGE UP (ref 10–40)
BETA+GAMMA TOCOPHEROL SERPL-MCNC: 2.7 MG/L — SIGNIFICANT CHANGE UP (ref 0.7–4.9)
BILIRUB SERPL-MCNC: 0.4 MG/DL — SIGNIFICANT CHANGE UP (ref 0.2–1.2)
BUN SERPL-MCNC: 8 MG/DL — SIGNIFICANT CHANGE UP (ref 7–23)
CADMIUM SERPL-MCNC: <0.5 UG/L — SIGNIFICANT CHANGE UP (ref 0–1.2)
CADMIUM SERPL-MCNC: <0.5 UG/L — SIGNIFICANT CHANGE UP (ref 0–1.2)
CALCIUM SERPL-MCNC: 9.7 MG/DL — SIGNIFICANT CHANGE UP (ref 8.4–10.5)
CELIAC DISEASE INTERPRETATION: SIGNIFICANT CHANGE UP
CELIAC GENE PAIRS PRESENT: SIGNIFICANT CHANGE UP
CHLORIDE SERPL-SCNC: 101 MMOL/L — SIGNIFICANT CHANGE UP (ref 96–108)
CO2 SERPL-SCNC: 18 MMOL/L — LOW (ref 22–31)
CREAT SERPL-MCNC: 0.47 MG/DL — LOW (ref 0.5–1.3)
DQ ALPHA 1: SIGNIFICANT CHANGE UP
DQ BETA 1: SIGNIFICANT CHANGE UP
EGFR: 138 ML/MIN/1.73M2 — SIGNIFICANT CHANGE UP
GD1A GANGL IGG+IGM SER IA-ACNC: 9 IV — SIGNIFICANT CHANGE UP (ref 0–50)
GD1B GANGL IGG+IGM SER IA-ACNC: 9 IV — SIGNIFICANT CHANGE UP (ref 0–50)
GLUCOSE SERPL-MCNC: 85 MG/DL — SIGNIFICANT CHANGE UP (ref 70–99)
GM1 ASIALO IGG+IGM SER IA-ACNC: 25 IV — SIGNIFICANT CHANGE UP (ref 0–50)
GM1 GANGL IGG+IGM SER-ACNC: 10 IV — SIGNIFICANT CHANGE UP (ref 0–50)
GM2 GANGL IGG+IGM SER IA-ACNC: 9 IV — SIGNIFICANT CHANGE UP (ref 0–50)
GQ1B GANGL IGG+IGM SER IA-ACNC: 6 IV — SIGNIFICANT CHANGE UP (ref 0–50)
IMMUNOGLOBULIN A (IGA), S: 321 MG/DL — SIGNIFICANT CHANGE UP (ref 61–356)
LEAD BLD-MCNC: <1 UG/DL — SIGNIFICANT CHANGE UP (ref 0–4)
LEAD BLD-MCNC: <1 UG/DL — SIGNIFICANT CHANGE UP (ref 0–4)
MAGNESIUM SERPL-MCNC: 2.1 MG/DL — SIGNIFICANT CHANGE UP (ref 1.6–2.6)
MERCURY SERPL-MCNC: <1 UG/L — SIGNIFICANT CHANGE UP (ref 0–14.9)
MERCURY SERPL-MCNC: <1 UG/L — SIGNIFICANT CHANGE UP (ref 0–14.9)
PHOSPHATE SERPL-MCNC: 3.9 MG/DL — SIGNIFICANT CHANGE UP (ref 2.5–4.5)
POTASSIUM SERPL-MCNC: 3.6 MMOL/L — SIGNIFICANT CHANGE UP (ref 3.5–5.3)
POTASSIUM SERPL-SCNC: 3.6 MMOL/L — SIGNIFICANT CHANGE UP (ref 3.5–5.3)
PROT SERPL-MCNC: 8 G/DL — SIGNIFICANT CHANGE UP (ref 6–8.3)
SODIUM SERPL-SCNC: 136 MMOL/L — SIGNIFICANT CHANGE UP (ref 135–145)

## 2022-07-01 PROCEDURE — 99231 SBSQ HOSP IP/OBS SF/LOW 25: CPT

## 2022-07-01 RX ORDER — LIDOCAINE 4 G/100G
2 CREAM TOPICAL DAILY
Refills: 0 | Status: DISCONTINUED | OUTPATIENT
Start: 2022-07-01 | End: 2022-07-05

## 2022-07-01 RX ORDER — IBUPROFEN 200 MG
600 TABLET ORAL THREE TIMES A DAY
Refills: 0 | Status: DISCONTINUED | OUTPATIENT
Start: 2022-07-01 | End: 2022-07-08

## 2022-07-01 RX ORDER — TIZANIDINE 4 MG/1
2 TABLET ORAL EVERY 8 HOURS
Refills: 0 | Status: DISCONTINUED | OUTPATIENT
Start: 2022-07-03 | End: 2022-07-08

## 2022-07-01 RX ORDER — TIZANIDINE 4 MG/1
2 TABLET ORAL EVERY 8 HOURS
Refills: 0 | Status: COMPLETED | OUTPATIENT
Start: 2022-07-01 | End: 2022-07-03

## 2022-07-01 RX ORDER — ACETAMINOPHEN 500 MG
1000 TABLET ORAL ONCE
Refills: 0 | Status: COMPLETED | OUTPATIENT
Start: 2022-07-01 | End: 2022-07-01

## 2022-07-01 RX ADMIN — GABAPENTIN 600 MILLIGRAM(S): 400 CAPSULE ORAL at 21:28

## 2022-07-01 RX ADMIN — Medication 75 MILLIGRAM(S): at 13:33

## 2022-07-01 RX ADMIN — Medication 100 MILLIGRAM(S): at 13:31

## 2022-07-01 RX ADMIN — Medication 400 MILLIGRAM(S): at 10:31

## 2022-07-01 RX ADMIN — Medication 1000 MILLIGRAM(S): at 02:14

## 2022-07-01 RX ADMIN — Medication 400 MILLIGRAM(S): at 10:15

## 2022-07-01 RX ADMIN — TIZANIDINE 2 MILLIGRAM(S): 4 TABLET ORAL at 17:33

## 2022-07-01 RX ADMIN — Medication 1 MILLIGRAM(S): at 13:31

## 2022-07-01 RX ADMIN — NORTRIPTYLINE HYDROCHLORIDE 10 MILLIGRAM(S): 10 CAPSULE ORAL at 13:30

## 2022-07-01 RX ADMIN — Medication 1 TABLET(S): at 13:30

## 2022-07-01 RX ADMIN — ENOXAPARIN SODIUM 40 MILLIGRAM(S): 100 INJECTION SUBCUTANEOUS at 17:34

## 2022-07-01 RX ADMIN — Medication 400 MILLIGRAM(S): at 01:17

## 2022-07-01 RX ADMIN — PREGABALIN 1000 MICROGRAM(S): 225 CAPSULE ORAL at 13:30

## 2022-07-01 RX ADMIN — Medication 75 MILLIGRAM(S): at 05:02

## 2022-07-01 RX ADMIN — Medication 600 MILLIGRAM(S): at 21:28

## 2022-07-01 NOTE — PROGRESS NOTE ADULT - SUBJECTIVE AND OBJECTIVE BOX
CHIEF COMPLAINT:  Patient is a 21y old  Female who presents with a chief complaint of Neuropathy (01 Jul 2022 06:44)    Interval HPI:   21F PMHx DM and HLD, no longer on medication.    Admitted with B/L peripheral foot neuropathy - pain, burning, numbness, pins and needles, sent in by outpatient neurologist after unremarkable EMG for work-up    Current out- patient pain regimen: Neurontin 300 mg TID  Out Patient Pain Management provider: none    Pt seen lying in bed, appears comfortable but still with neuropathic pain as described above, states it is worsened/triggered when she bends over. Also endorses cramping/spasms of toes - this is worst pain when it happens. Feeling good effect from Pamelor and Gabapentinoids. Discussed opioids with pt, explained that they are not indicated for Neuropathic pain and we should see how she responds to treatment. Pt agreeable and does not wish to take opioids.    PHYSICAL EXAM  GENERAL: seen at bedside; NAD; well developed, well groomed; no signs of toxicity  HEENT: head atraumatic, normocephalic; anicteric; speech clear and fluent  NEURO: A + O X 3; good concentration; + allodynia and hyperesthesias of B/L feet   GI: abdomen soft, last BM 2 days ago  :  voiding  EXTREMITIES: moving all extremities; independent ambulator  SKIN: no rashes, lesions    PSYCH: affect normal; good eye contact; no signs of depression or anxiety      T(C): 36.4 (07-01-22 @ 04:49)  HR: 83 (07-01-22 @ 04:49)  BP: 130/83 (07-01-22 @ 04:49)  RR: 18 (07-01-22 @ 04:49)  SpO2: 98% (07-01-22 @ 04:49)      MEDICATIONS  (STANDING):  cyanocobalamin 1000 MICROGram(s) Oral daily  enoxaparin Injectable 40 milliGRAM(s) SubCutaneous every 24 hours  folic acid 1 milliGRAM(s) Oral daily  gabapentin 600 milliGRAM(s) Oral <User Schedule>  influenza   Vaccine 0.5 milliLiter(s) IntraMuscular once  multivitamin/minerals 1 Tablet(s) Oral daily  nortriptyline 10 milliGRAM(s) Oral daily  pregabalin 75 milliGRAM(s) Oral <User Schedule>  pyridoxine 100 milliGRAM(s) Oral daily  thiamine 100 milliGRAM(s) Oral daily    MEDICATIONS  (PRN):  acetaminophen     Tablet .. 650 milliGRAM(s) Oral every 6 hours PRN Temp greater or equal to 38C (100.4F), Mild Pain (1 - 3)  ibuprofen  Tablet. 400 milliGRAM(s) Oral three times a day PRN Moderate Pain (4 - 6)  melatonin 3 milliGRAM(s) Oral at bedtime PRN Insomnia      Allergies    No Known Allergies        Pertinent labs, radiology, additional studies:  Reviewed                          12.4   7.46  )-----------( 272      ( 30 Jun 2022 07:21 )             39.1       07-01    136  |  101  |  8   ----------------------------<  85  3.6   |  18<L>  |  0.47<L>    Ca    9.7      01 Jul 2022 07:28  Phos  3.9     07-01  Mg     2.1     07-01    TPro  8.0  /  Alb  4.3  /  TBili  0.4  /  DBili  x   /  AST  21  /  ALT  34  /  AlkPhos  64  07-01

## 2022-07-01 NOTE — PROGRESS NOTE ADULT - ASSESSMENT
21F PMHx DM and HLD, no longer on medication.    Admitted with B/L peripheral foot neuropathy - pain, burning, numbness, pins and needles, sent in by outpatient neurologist after unremarkable EMG for work-up    Current out- patient pain regimen: Neurontin 300 mg TID  Out Patient Pain Management provider: none    Pt still with neuropathic pain as described above, states it is worsened/triggered when she bends over. Also endorses cramping/spasms of toes - this is worst pain when it happens. Feeling good effect from Pamelor and Gabapentinoids. Discussed opioids with pt, explained that they are not indicated for Neuropathic pain and we should see how she responds to treatment. Pt agreeable and does not wish to take opioids.    Plan discussed with primary team:  Start Tizanidine 2 mg every 8 hours ATC x 48 hours then change to PRN spasm  Start Lidocaine TD patch to feet daily (on for 12 hours)  Consider spinal imaging (ex. MRI LS spine or as decided by Neuro/primary team)  Continue Neurontin 600 mg at HS.  Continue Lyrica 75 mg in AM and afternoon, first dose today.  Continue Pamelor 10 mg at HS, increase as necessary.  Continue Ibuprofen PRN.  Current CrCl is 220  Bowel Regimen  Incentive Spirometer  PT per primary team      Time spent on encounter:    25    Minutes    [facility]  [phone]

## 2022-07-01 NOTE — PROGRESS NOTE ADULT - ASSESSMENT
20 yo F with PMH of DM and HDL presents with distal neuropathic pain to bilateral legs and swelling. Patient previously worked up outpatient with EMG, along with CT brain and LP at ED visit.

## 2022-07-01 NOTE — PROGRESS NOTE ADULT - SUBJECTIVE AND OBJECTIVE BOX
PROGRESS NOTE:     Patient is a 21y old  Female who presents with a chief complaint of Neuropathy (30 Jun 2022 17:01)      SUBJECTIVE / OVERNIGHT EVENTS:    No acute events overnight. Patient examined at bedside with no acute complaints.     Pain:  Bowel Movements:  Urination:  OOB:  PT:    REVIEW OF SYSTEMS:    CONSTITUTIONAL: No weakness, fevers or chills  EYES/ENT: No visual changes;  No vertigo or throat pain   NECK: No pain or stiffness  RESPIRATORY: No cough, wheezing, hemoptysis; No shortness of breath  CARDIOVASCULAR: No chest pain or palpitations  GASTROINTESTINAL: No abdominal or epigastric pain. No nausea, vomiting, or hematemesis; No diarrhea or constipation. No melena or hematochezia.  GENITOURINARY: No dysuria, frequency or hematuria  NEUROLOGICAL: No numbness or weakness  SKIN: No itching, rashes      MEDICATIONS  (STANDING):  cyanocobalamin 1000 MICROGram(s) Oral daily  enoxaparin Injectable 40 milliGRAM(s) SubCutaneous every 24 hours  folic acid 1 milliGRAM(s) Oral daily  gabapentin 600 milliGRAM(s) Oral <User Schedule>  influenza   Vaccine 0.5 milliLiter(s) IntraMuscular once  multivitamin/minerals 1 Tablet(s) Oral daily  nortriptyline 10 milliGRAM(s) Oral daily  pregabalin 75 milliGRAM(s) Oral <User Schedule>  pyridoxine 100 milliGRAM(s) Oral daily  thiamine 100 milliGRAM(s) Oral daily    MEDICATIONS  (PRN):  acetaminophen     Tablet .. 650 milliGRAM(s) Oral every 6 hours PRN Temp greater or equal to 38C (100.4F), Mild Pain (1 - 3)  ibuprofen  Tablet. 400 milliGRAM(s) Oral three times a day PRN Moderate Pain (4 - 6)  melatonin 3 milliGRAM(s) Oral at bedtime PRN Insomnia      CAPILLARY BLOOD GLUCOSE        I&O's Summary    30 Jun 2022 07:01  -  01 Jul 2022 06:44  --------------------------------------------------------  IN: 240 mL / OUT: 0 mL / NET: 240 mL        VITALS:   T(C): 36.4 (07-01-22 @ 04:49), Max: 37.1 (06-30-22 @ 20:04)  HR: 83 (07-01-22 @ 04:49) (81 - 104)  BP: 130/83 (07-01-22 @ 04:49) (115/81 - 145/83)  RR: 18 (07-01-22 @ 04:49) (18 - 18)  SpO2: 98% (07-01-22 @ 04:49) (98% - 99%)    GENERAL: NAD, lying in bed comfortably  HEAD:  Atraumatic, normocephalic  EYES: EOMI, PERRLA, conjunctiva and sclera clear  ENT: Moist mucous membranes  NECK: Supple, no JVD  HEART: Regular rate and rhythm, no murmurs, rubs, or gallops  LUNGS: Unlabored respirations.  Clear to auscultation bilaterally, no crackles, wheezing, or rhonchi  ABDOMEN: Soft, nontender, nondistended, +BS  EXTREMITIES: 2+ peripheral pulses bilaterally. No clubbing, cyanosis, or edema  NERVOUS SYSTEM:  A&Ox3, no focal deficits   SKIN: No rashes or lesions    LABS:                        12.4   7.46  )-----------( 272      ( 30 Jun 2022 07:21 )             39.1     06-30    137  |  103  |  7   ----------------------------<  81  3.5   |  23  |  0.46<L>    Ca    9.3      30 Jun 2022 07:20  Phos  4.5     06-30  Mg     2.1     06-30    TPro  6.9  /  Alb  3.7  /  TBili  0.5  /  DBili  x   /  AST  15  /  ALT  26  /  AlkPhos  55  06-30                RADIOLOGY & ADDITIONAL TESTS:  Results Reviewed:   Imaging Personally Reviewed:  Electrocardiogram Personally Reviewed:    COORDINATION OF CARE:  Care Discussed with Consultants/Other Providers [Y/N]:  Prior or Outpatient Records Reviewed [Y/N]:   PROGRESS NOTE:     Patient is a 21y old  Female who presents with a chief complaint of Neuropathy (30 Jun 2022 17:01)      SUBJECTIVE / OVERNIGHT EVENTS:    Patient examined at bedside. Patient reports 9/10 pain overnight, medication administered, no relief from ibuprofen or acetaminophen. Patient reports pain worse with back bending.     Pain: 9/10   Bowel Movements: regular  Urination: regular  OOB: as tolerated   PT: evaluated     REVIEW OF SYSTEMS:    CONSTITUTIONAL: Severe pain   EYES/ENT: No visual changes;  No vertigo or throat pain   NECK: No pain or stiffness  RESPIRATORY: No cough, wheezing, hemoptysis; No shortness of breath  CARDIOVASCULAR: No chest pain or palpitations  GASTROINTESTINAL: No abdominal or epigastric pain. No nausea, vomiting, or hematemesis; No diarrhea or constipation. No melena or hematochezia.  GENITOURINARY: No dysuria, frequency or hematuria  NEUROLOGICAL: Severe pain   SKIN: No itching, rashes      MEDICATIONS  (STANDING):  cyanocobalamin 1000 MICROGram(s) Oral daily  enoxaparin Injectable 40 milliGRAM(s) SubCutaneous every 24 hours  folic acid 1 milliGRAM(s) Oral daily  gabapentin 600 milliGRAM(s) Oral <User Schedule>  influenza   Vaccine 0.5 milliLiter(s) IntraMuscular once  multivitamin/minerals 1 Tablet(s) Oral daily  nortriptyline 10 milliGRAM(s) Oral daily  pregabalin 75 milliGRAM(s) Oral <User Schedule>  pyridoxine 100 milliGRAM(s) Oral daily  thiamine 100 milliGRAM(s) Oral daily    MEDICATIONS  (PRN):  acetaminophen     Tablet .. 650 milliGRAM(s) Oral every 6 hours PRN Temp greater or equal to 38C (100.4F), Mild Pain (1 - 3)  ibuprofen  Tablet. 400 milliGRAM(s) Oral three times a day PRN Moderate Pain (4 - 6)  melatonin 3 milliGRAM(s) Oral at bedtime PRN Insomnia      CAPILLARY BLOOD GLUCOSE        I&O's Summary    30 Jun 2022 07:01  -  01 Jul 2022 06:44  --------------------------------------------------------  IN: 240 mL / OUT: 0 mL / NET: 240 mL        VITALS:   T(C): 36.4 (07-01-22 @ 04:49), Max: 37.1 (06-30-22 @ 20:04)  HR: 83 (07-01-22 @ 04:49) (81 - 104)  BP: 130/83 (07-01-22 @ 04:49) (115/81 - 145/83)  RR: 18 (07-01-22 @ 04:49) (18 - 18)  SpO2: 98% (07-01-22 @ 04:49) (98% - 99%)    GENERAL: NAD, lying in bed comfortably  HEAD:  Atraumatic, normocephalic  EYES: EOMI, PERRLA, conjunctiva and sclera clear  ENT: Moist mucous membranes  NECK: Supple, no JVD  HEART: Regular rate and rhythm, no murmurs, rubs, or gallops  LUNGS: Unlabored respirations.  Clear to auscultation bilaterally, no crackles, wheezing, or rhonchi  ABDOMEN: Soft, nontender, nondistended, +BS  EXTREMITIES: 2+ peripheral pulses bilaterally. No clubbing, cyanosis, or edema  NERVOUS SYSTEM:  A&Ox3, no focal deficits   SKIN: No rashes or lesions    LABS:                        12.4   7.46  )-----------( 272      ( 30 Jun 2022 07:21 )             39.1     06-30    137  |  103  |  7   ----------------------------<  81  3.5   |  23  |  0.46<L>    Ca    9.3      30 Jun 2022 07:20  Phos  4.5     06-30  Mg     2.1     06-30    TPro  6.9  /  Alb  3.7  /  TBili  0.5  /  DBili  x   /  AST  15  /  ALT  26  /  AlkPhos  55  06-30                RADIOLOGY & ADDITIONAL TESTS:  Results Reviewed:     LYME IgG/IgM Antibodies Result: 0.14 Index (06.29.22 @ 19:02)   Ganglioside Antibodies (06.28.22 @ 07:11)   Asialo-GM1 Antibodies, IgG/IgM: 25 IV   GM1 Antibodies, IgG/IgM: 10 IV   GM2 Antibodies, IgG/IgM: 9 IV   GD1a Antibodies, IgG/IgM: 9 IV   GD1b Antibodies, IgG/IgM: 9 IV   GQ1b Antibodies, IgG/IgM: 6:     HIV-1/2 Combo Result: Nonreact    Rheumatoid Factor Quant, Serum or Plasma: <10    Imaging Personally Reviewed:  Electrocardiogram Personally Reviewed:    COORDINATION OF CARE:  Care Discussed with Consultants/Other Providers [Y/N]:  Prior or Outpatient Records Reviewed [Y/N]:   PROGRESS NOTE:     Patient is a 21y old  Female who presents with a chief complaint of Neuropathy (30 Jun 2022 17:01)      SUBJECTIVE / OVERNIGHT EVENTS:    Patient examined at bedside. Patient reports 9/10 pain overnight, medication administered, no relief from ibuprofen or acetaminophen. Patient reports pain worse with back bending.     Pain: 9/10   Bowel Movements: regular  Urination: regular  OOB: as tolerated   PT: evaluated     REVIEW OF SYSTEMS:    CONSTITUTIONAL: Severe pain   EYES/ENT: No visual changes;  No vertigo or throat pain   NECK: No pain or stiffness  RESPIRATORY: No cough, wheezing, hemoptysis; No shortness of breath  CARDIOVASCULAR: No chest pain or palpitations  GASTROINTESTINAL: No abdominal or epigastric pain. No nausea, vomiting, or hematemesis; No diarrhea or constipation. No melena or hematochezia.  GENITOURINARY: No dysuria, frequency or hematuria  NEUROLOGICAL: Severe pain   SKIN: No itching, rashes      MEDICATIONS  (STANDING):  cyanocobalamin 1000 MICROGram(s) Oral daily  enoxaparin Injectable 40 milliGRAM(s) SubCutaneous every 24 hours  folic acid 1 milliGRAM(s) Oral daily  gabapentin 600 milliGRAM(s) Oral <User Schedule>  influenza   Vaccine 0.5 milliLiter(s) IntraMuscular once  multivitamin/minerals 1 Tablet(s) Oral daily  nortriptyline 10 milliGRAM(s) Oral daily  pregabalin 75 milliGRAM(s) Oral <User Schedule>  pyridoxine 100 milliGRAM(s) Oral daily  thiamine 100 milliGRAM(s) Oral daily    MEDICATIONS  (PRN):  acetaminophen     Tablet .. 650 milliGRAM(s) Oral every 6 hours PRN Temp greater or equal to 38C (100.4F), Mild Pain (1 - 3)  ibuprofen  Tablet. 400 milliGRAM(s) Oral three times a day PRN Moderate Pain (4 - 6)  melatonin 3 milliGRAM(s) Oral at bedtime PRN Insomnia      CAPILLARY BLOOD GLUCOSE        I&O's Summary    30 Jun 2022 07:01  -  01 Jul 2022 06:44  --------------------------------------------------------  IN: 240 mL / OUT: 0 mL / NET: 240 mL        VITALS:   T(C): 36.4 (07-01-22 @ 04:49), Max: 37.1 (06-30-22 @ 20:04)  HR: 83 (07-01-22 @ 04:49) (81 - 104)  BP: 130/83 (07-01-22 @ 04:49) (115/81 - 145/83)  RR: 18 (07-01-22 @ 04:49) (18 - 18)  SpO2: 98% (07-01-22 @ 04:49) (98% - 99%)    GENERAL: NAD, lying in bed comfortably  HEAD:  Atraumatic, normocephalic  EYES: EOMI, PERRLA, conjunctiva and sclera clear  ENT: Moist mucous membranes  NECK: Supple, no JVD  HEART: Regular rate and rhythm, no murmurs, rubs, or gallops  LUNGS: Unlabored respirations.  Clear to auscultation bilaterally, no crackles, wheezing, or rhonchi  ABDOMEN: Soft, nontender, nondistended, +BS  EXTREMITIES: 2+ peripheral pulses bilaterally. No clubbing, cyanosis, or edema  NERVOUS SYSTEM:  A&Ox3, no focal deficits   SKIN: No rashes or lesions    LABS:                        12.4   7.46  )-----------( 272      ( 30 Jun 2022 07:21 )             39.1     06-30    137  |  103  |  7   ----------------------------<  81  3.5   |  23  |  0.46<L>    Ca    9.3      30 Jun 2022 07:20  Phos  4.5     06-30  Mg     2.1     06-30    TPro  6.9  /  Alb  3.7  /  TBili  0.5  /  DBili  x   /  AST  15  /  ALT  26  /  AlkPhos  55  06-30                RADIOLOGY & ADDITIONAL TESTS:  Results Reviewed:     LYME IgG/IgM Antibodies Result: 0.14 Index (06.29.22 @ 19:02)   Ganglioside Antibodies (06.28.22 @ 07:11)   Asialo-GM1 Antibodies, IgG/IgM: 25 IV   GM1 Antibodies, IgG/IgM: 10 IV   GM2 Antibodies, IgG/IgM: 9 IV   GD1a Antibodies, IgG/IgM: 9 IV   GD1b Antibodies, IgG/IgM: 9 IV   GQ1b Antibodies, IgG/IgM: 6:     HIV-1/2 Combo Result: Nonreact    Rheumatoid Factor Quant, Serum or Plasma: <10

## 2022-07-01 NOTE — PROGRESS NOTE ADULT - PROBLEM SELECTOR PLAN 1
- Previous CT brain unremarkable. Previous LP unremarkable.   - Neurology consulted, appreciate recs.   - Increase Gabapentin to 600 mg TID.   - Ibuprofen ordered to be given with gabapentin doses.  - IV tylenol for breatkthrough pain  - Pain management consulted, will f/u recs  - Follow-up pending laboratory studies.   - Folate level < 2.0. Will supplement. - Previous CT brain unremarkable. Previous LP unremarkable.   - Neurology consulted, appreciate recs.   - Increase Gabapentin to 600 mg TID.   - Ibuprofen ordered to be given with gabapentin doses.  - IV tylenol for breatkthrough pain  - Pain management consulted. Start Tizanidine 200 mg TID.   - Increase ibuprofen to 600 mg.   - Follow-up pending laboratory studies.   - Folate level < 2.0. Will supplement.  - MRI C-spine and T-spine.   - Doppler u/s venous b/l LLE

## 2022-07-02 LAB
ALBUMIN SERPL ELPH-MCNC: 3.8 G/DL — SIGNIFICANT CHANGE UP (ref 3.3–5)
ALP SERPL-CCNC: 56 U/L — SIGNIFICANT CHANGE UP (ref 40–120)
ALT FLD-CCNC: 38 U/L — SIGNIFICANT CHANGE UP (ref 10–45)
ANION GAP SERPL CALC-SCNC: 12 MMOL/L — SIGNIFICANT CHANGE UP (ref 5–17)
AST SERPL-CCNC: 23 U/L — SIGNIFICANT CHANGE UP (ref 10–40)
BILIRUB SERPL-MCNC: 0.4 MG/DL — SIGNIFICANT CHANGE UP (ref 0.2–1.2)
BUN SERPL-MCNC: 8 MG/DL — SIGNIFICANT CHANGE UP (ref 7–23)
CALCIUM SERPL-MCNC: 9.3 MG/DL — SIGNIFICANT CHANGE UP (ref 8.4–10.5)
CHLORIDE SERPL-SCNC: 103 MMOL/L — SIGNIFICANT CHANGE UP (ref 96–108)
CO2 SERPL-SCNC: 22 MMOL/L — SIGNIFICANT CHANGE UP (ref 22–31)
CREAT SERPL-MCNC: 0.57 MG/DL — SIGNIFICANT CHANGE UP (ref 0.5–1.3)
EGFR: 132 ML/MIN/1.73M2 — SIGNIFICANT CHANGE UP
GLUCOSE SERPL-MCNC: 88 MG/DL — SIGNIFICANT CHANGE UP (ref 70–99)
HCT VFR BLD CALC: 38 % — SIGNIFICANT CHANGE UP (ref 34.5–45)
HGB BLD-MCNC: 12.1 G/DL — SIGNIFICANT CHANGE UP (ref 11.5–15.5)
MAGNESIUM SERPL-MCNC: 2.1 MG/DL — SIGNIFICANT CHANGE UP (ref 1.6–2.6)
MCHC RBC-ENTMCNC: 26.7 PG — LOW (ref 27–34)
MCHC RBC-ENTMCNC: 31.8 GM/DL — LOW (ref 32–36)
MCV RBC AUTO: 83.9 FL — SIGNIFICANT CHANGE UP (ref 80–100)
METHYLMALONATE SERPL-SCNC: 81 NMOL/L — SIGNIFICANT CHANGE UP (ref 0–378)
NRBC # BLD: 0 /100 WBCS — SIGNIFICANT CHANGE UP (ref 0–0)
PHOSPHATE SERPL-MCNC: 4.5 MG/DL — SIGNIFICANT CHANGE UP (ref 2.5–4.5)
PLATELET # BLD AUTO: 247 K/UL — SIGNIFICANT CHANGE UP (ref 150–400)
POTASSIUM SERPL-MCNC: 4.1 MMOL/L — SIGNIFICANT CHANGE UP (ref 3.5–5.3)
POTASSIUM SERPL-SCNC: 4.1 MMOL/L — SIGNIFICANT CHANGE UP (ref 3.5–5.3)
PROT SERPL-MCNC: 6.8 G/DL — SIGNIFICANT CHANGE UP (ref 6–8.3)
PYRIDOXAL PHOS SERPL-MCNC: 28.7 UG/L — SIGNIFICANT CHANGE UP (ref 3.4–65.2)
RBC # BLD: 4.53 M/UL — SIGNIFICANT CHANGE UP (ref 3.8–5.2)
RBC # FLD: 17.8 % — HIGH (ref 10.3–14.5)
SODIUM SERPL-SCNC: 137 MMOL/L — SIGNIFICANT CHANGE UP (ref 135–145)
VIT B1 SERPL-MCNC: 154.6 NMOL/L — SIGNIFICANT CHANGE UP (ref 66.5–200)
WBC # BLD: 7.79 K/UL — SIGNIFICANT CHANGE UP (ref 3.8–10.5)
WBC # FLD AUTO: 7.79 K/UL — SIGNIFICANT CHANGE UP (ref 3.8–10.5)

## 2022-07-02 PROCEDURE — 72141 MRI NECK SPINE W/O DYE: CPT | Mod: 26

## 2022-07-02 PROCEDURE — 72146 MRI CHEST SPINE W/O DYE: CPT | Mod: 26

## 2022-07-02 RX ORDER — NORTRIPTYLINE HYDROCHLORIDE 10 MG/1
20 CAPSULE ORAL AT BEDTIME
Refills: 0 | Status: DISCONTINUED | OUTPATIENT
Start: 2022-07-02 | End: 2022-07-03

## 2022-07-02 RX ADMIN — GABAPENTIN 600 MILLIGRAM(S): 400 CAPSULE ORAL at 21:26

## 2022-07-02 RX ADMIN — TIZANIDINE 2 MILLIGRAM(S): 4 TABLET ORAL at 21:25

## 2022-07-02 RX ADMIN — Medication 100 MILLIGRAM(S): at 13:08

## 2022-07-02 RX ADMIN — Medication 600 MILLIGRAM(S): at 21:25

## 2022-07-02 RX ADMIN — Medication 75 MILLIGRAM(S): at 13:13

## 2022-07-02 RX ADMIN — NORTRIPTYLINE HYDROCHLORIDE 10 MILLIGRAM(S): 10 CAPSULE ORAL at 13:07

## 2022-07-02 RX ADMIN — Medication 75 MILLIGRAM(S): at 05:11

## 2022-07-02 RX ADMIN — TIZANIDINE 2 MILLIGRAM(S): 4 TABLET ORAL at 01:22

## 2022-07-02 RX ADMIN — PREGABALIN 1000 MICROGRAM(S): 225 CAPSULE ORAL at 13:08

## 2022-07-02 RX ADMIN — ENOXAPARIN SODIUM 40 MILLIGRAM(S): 100 INJECTION SUBCUTANEOUS at 18:35

## 2022-07-02 RX ADMIN — Medication 600 MILLIGRAM(S): at 05:11

## 2022-07-02 RX ADMIN — Medication 1 TABLET(S): at 13:07

## 2022-07-02 RX ADMIN — TIZANIDINE 2 MILLIGRAM(S): 4 TABLET ORAL at 13:07

## 2022-07-02 RX ADMIN — Medication 1 MILLIGRAM(S): at 13:07

## 2022-07-02 RX ADMIN — Medication 600 MILLIGRAM(S): at 13:12

## 2022-07-02 RX ADMIN — Medication 100 MILLIGRAM(S): at 13:07

## 2022-07-02 NOTE — PROGRESS NOTE ADULT - SUBJECTIVE AND OBJECTIVE BOX
PROGRESS NOTE:     Patient is a 21y old  Female who presents with a chief complaint of Neuropathy (01 Jul 2022 12:13)      SUBJECTIVE / OVERNIGHT EVENTS:    No acute events overnight. Patient examined at bedside with no acute complaints.     Pain:  Bowel Movements:  Urination:  OOB:  PT:    REVIEW OF SYSTEMS:    CONSTITUTIONAL: No weakness, fevers or chills  EYES/ENT: No visual changes;  No vertigo or throat pain   NECK: No pain or stiffness  RESPIRATORY: No cough, wheezing, hemoptysis; No shortness of breath  CARDIOVASCULAR: No chest pain or palpitations  GASTROINTESTINAL: No abdominal or epigastric pain. No nausea, vomiting, or hematemesis; No diarrhea or constipation. No melena or hematochezia.  GENITOURINARY: No dysuria, frequency or hematuria  NEUROLOGICAL: No numbness or weakness  SKIN: No itching, rashes      MEDICATIONS  (STANDING):  cyanocobalamin 1000 MICROGram(s) Oral daily  enoxaparin Injectable 40 milliGRAM(s) SubCutaneous every 24 hours  folic acid 1 milliGRAM(s) Oral daily  gabapentin 600 milliGRAM(s) Oral <User Schedule>  ibuprofen  Tablet. 600 milliGRAM(s) Oral three times a day  influenza   Vaccine 0.5 milliLiter(s) IntraMuscular once  lidocaine   4% Patch 2 Patch Transdermal daily  multivitamin/minerals 1 Tablet(s) Oral daily  nortriptyline 10 milliGRAM(s) Oral daily  pregabalin 75 milliGRAM(s) Oral <User Schedule>  pyridoxine 100 milliGRAM(s) Oral daily  thiamine 100 milliGRAM(s) Oral daily  tiZANidine 2 milliGRAM(s) Oral every 8 hours    MEDICATIONS  (PRN):  acetaminophen     Tablet .. 650 milliGRAM(s) Oral every 6 hours PRN Temp greater or equal to 38C (100.4F), Mild Pain (1 - 3)  melatonin 3 milliGRAM(s) Oral at bedtime PRN Insomnia      CAPILLARY BLOOD GLUCOSE        I&O's Summary      VITALS:   T(C): 36.7 (07-02-22 @ 04:49), Max: 37.2 (07-01-22 @ 20:06)  HR: 97 (07-02-22 @ 04:49) (97 - 107)  BP: 113/82 (07-02-22 @ 04:49) (113/82 - 120/86)  RR: 18 (07-02-22 @ 04:49) (18 - 18)  SpO2: 100% (07-02-22 @ 04:49) (98% - 100%)    GENERAL: NAD, lying in bed comfortably  HEAD:  Atraumatic, normocephalic  EYES: EOMI, PERRLA, conjunctiva and sclera clear  ENT: Moist mucous membranes  NECK: Supple, no JVD  HEART: Regular rate and rhythm, no murmurs, rubs, or gallops  LUNGS: Unlabored respirations.  Clear to auscultation bilaterally, no crackles, wheezing, or rhonchi  ABDOMEN: Soft, nontender, nondistended, +BS  EXTREMITIES: 2+ peripheral pulses bilaterally. No clubbing, cyanosis, or edema  NERVOUS SYSTEM:  A&Ox3, no focal deficits   SKIN: No rashes or lesions    LABS:    07-01    136  |  101  |  8   ----------------------------<  85  3.6   |  18<L>  |  0.47<L>    Ca    9.7      01 Jul 2022 07:28  Phos  3.9     07-01  Mg     2.1     07-01    TPro  8.0  /  Alb  4.3  /  TBili  0.4  /  DBili  x   /  AST  21  /  ALT  34  /  AlkPhos  64  07-01                RADIOLOGY & ADDITIONAL TESTS:  Results Reviewed:   Imaging Personally Reviewed:  Electrocardiogram Personally Reviewed:    COORDINATION OF CARE:  Care Discussed with Consultants/Other Providers [Y/N]:  Prior or Outpatient Records Reviewed [Y/N]:   PROGRESS NOTE:     Patient is a 21y old  Female who presents with a chief complaint of Neuropathy (01 Jul 2022 12:13)      SUBJECTIVE / OVERNIGHT EVENTS:    No acute events overnight. Patient examined at bedside with no acute complaints. Patient reports pain is "manageable" now.     Pain: 4/10   Bowel Movements: regular   Urination: regular   OOB: as tolerated   PT: evaluated     REVIEW OF SYSTEMS:    CONSTITUTIONAL: No weakness, fevers or chills  EYES/ENT: No visual changes;  No vertigo or throat pain   NECK: No pain or stiffness  RESPIRATORY: No cough, wheezing, hemoptysis; No shortness of breath  CARDIOVASCULAR: No chest pain or palpitations  GASTROINTESTINAL: No abdominal or epigastric pain. No nausea, vomiting, or hematemesis; No diarrhea or constipation. No melena or hematochezia.  GENITOURINARY: No dysuria, frequency or hematuria  NEUROLOGICAL: +Numbness, +pain   SKIN: No itching, rashes      MEDICATIONS  (STANDING):  cyanocobalamin 1000 MICROGram(s) Oral daily  enoxaparin Injectable 40 milliGRAM(s) SubCutaneous every 24 hours  folic acid 1 milliGRAM(s) Oral daily  gabapentin 600 milliGRAM(s) Oral <User Schedule>  ibuprofen  Tablet. 600 milliGRAM(s) Oral three times a day  influenza   Vaccine 0.5 milliLiter(s) IntraMuscular once  lidocaine   4% Patch 2 Patch Transdermal daily  multivitamin/minerals 1 Tablet(s) Oral daily  nortriptyline 10 milliGRAM(s) Oral daily  pregabalin 75 milliGRAM(s) Oral <User Schedule>  pyridoxine 100 milliGRAM(s) Oral daily  thiamine 100 milliGRAM(s) Oral daily  tiZANidine 2 milliGRAM(s) Oral every 8 hours    MEDICATIONS  (PRN):  acetaminophen     Tablet .. 650 milliGRAM(s) Oral every 6 hours PRN Temp greater or equal to 38C (100.4F), Mild Pain (1 - 3)  melatonin 3 milliGRAM(s) Oral at bedtime PRN Insomnia      CAPILLARY BLOOD GLUCOSE        I&O's Summary      VITALS:   T(C): 36.7 (07-02-22 @ 04:49), Max: 37.2 (07-01-22 @ 20:06)  HR: 97 (07-02-22 @ 04:49) (97 - 107)  BP: 113/82 (07-02-22 @ 04:49) (113/82 - 120/86)  RR: 18 (07-02-22 @ 04:49) (18 - 18)  SpO2: 100% (07-02-22 @ 04:49) (98% - 100%)    GENERAL: NAD, lying in bed comfortably  HEAD:  Atraumatic, normocephalic  EYES: EOMI, PERRLA, conjunctiva and sclera clear  ENT: Moist mucous membranes  NECK: Supple, no JVD  HEART: Regular rate and rhythm, no murmurs, rubs, or gallops  LUNGS: Unlabored respirations.  Clear to auscultation bilaterally, no crackles, wheezing, or rhonchi  ABDOMEN: Soft, nontender, nondistended, +BS  EXTREMITIES: 2+ peripheral pulses bilaterally. No clubbing, cyanosis, or edema  NERVOUS SYSTEM:  A&Ox3, no focal deficits   SKIN: No rashes or lesions    LABS:    07-01    136  |  101  |  8   ----------------------------<  85  3.6   |  18<L>  |  0.47<L>    Ca    9.7      01 Jul 2022 07:28  Phos  3.9     07-01  Mg     2.1     07-01    TPro  8.0  /  Alb  4.3  /  TBili  0.4  /  DBili  x   /  AST  21  /  ALT  34  /  AlkPhos  64  07-01

## 2022-07-02 NOTE — PROGRESS NOTE ADULT - PROBLEM SELECTOR PLAN 1
- Previous CT brain unremarkable. Previous LP unremarkable.   - Neurology consulted, appreciate recs.   - Increase Gabapentin to 600 mg TID.   - Ibuprofen ordered to be given with gabapentin doses.  - IV tylenol for breatkthrough pain  - Pain management consulted. Start Tizanidine 200 mg TID.   - Increase ibuprofen to 600 mg.   - Follow-up pending laboratory studies.   - Folate level < 2.0. Will supplement.  - MRI C-spine and T-spine.   - Doppler u/s venous b/l LLE - Previous CT brain unremarkable. Previous LP unremarkable.   - Neurology consulted, appreciate recs.   - Increase Gabapentin to 600 mg TID.   - Ibuprofen ordered to be given with gabapentin doses.  - IV tylenol for breatkthrough pain  - Pain management consulted. Start Tizanidine 200 mg TID.   - Increase ibuprofen to 600 mg.   - Follow-up pending laboratory studies.   - Folate level < 2.0. Will supplement.  - f/u MRI C-spine and T-spine.   - f/u Doppler u/s venous b/l LLE - Previous CT brain unremarkable. Previous LP unremarkable.   - Neurology consulted, appreciate recs.   - Increase Gabapentin to 600 mg TID.   - Ibuprofen ordered to be given with gabapentin doses.  - IV tylenol for breakthrough pain  - Pain management consulted. Appreciate recs. c/w Tizanidine 200 mg TID.   - c/w ibuprofen 600 mg TID.   - Follow-up pending laboratory studies.   - Folate level < 2.0. Will supplement.  - MRI C-spine and T-spine unremarkable.  - f/u us duplex b/l LE    - f/u Doppler u/s venous b/l LLE

## 2022-07-03 LAB
ALBUMIN SERPL ELPH-MCNC: 4 G/DL — SIGNIFICANT CHANGE UP (ref 3.3–5)
ALP SERPL-CCNC: 56 U/L — SIGNIFICANT CHANGE UP (ref 40–120)
ALT FLD-CCNC: 46 U/L — HIGH (ref 10–45)
ANION GAP SERPL CALC-SCNC: 12 MMOL/L — SIGNIFICANT CHANGE UP (ref 5–17)
AST SERPL-CCNC: 21 U/L — SIGNIFICANT CHANGE UP (ref 10–40)
BILIRUB SERPL-MCNC: 0.5 MG/DL — SIGNIFICANT CHANGE UP (ref 0.2–1.2)
BUN SERPL-MCNC: 9 MG/DL — SIGNIFICANT CHANGE UP (ref 7–23)
CALCIUM SERPL-MCNC: 9.4 MG/DL — SIGNIFICANT CHANGE UP (ref 8.4–10.5)
CHLORIDE SERPL-SCNC: 103 MMOL/L — SIGNIFICANT CHANGE UP (ref 96–108)
CO2 SERPL-SCNC: 21 MMOL/L — LOW (ref 22–31)
CREAT SERPL-MCNC: 0.48 MG/DL — LOW (ref 0.5–1.3)
EGFR: 137 ML/MIN/1.73M2 — SIGNIFICANT CHANGE UP
GLUCOSE SERPL-MCNC: 82 MG/DL — SIGNIFICANT CHANGE UP (ref 70–99)
HCT VFR BLD CALC: 37.1 % — SIGNIFICANT CHANGE UP (ref 34.5–45)
HGB BLD-MCNC: 12 G/DL — SIGNIFICANT CHANGE UP (ref 11.5–15.5)
HOMOCYSTEINE LEVEL: 17.7 UMOL/L — HIGH
MAGNESIUM SERPL-MCNC: 2.1 MG/DL — SIGNIFICANT CHANGE UP (ref 1.6–2.6)
MCHC RBC-ENTMCNC: 27.6 PG — SIGNIFICANT CHANGE UP (ref 27–34)
MCHC RBC-ENTMCNC: 32.3 GM/DL — SIGNIFICANT CHANGE UP (ref 32–36)
MCV RBC AUTO: 85.3 FL — SIGNIFICANT CHANGE UP (ref 80–100)
METHYLMALONIC ACID LEVEL: 82 NMOL/L — LOW (ref 87–318)
NRBC # BLD: 0 /100 WBCS — SIGNIFICANT CHANGE UP (ref 0–0)
PHOSPHATE SERPL-MCNC: 3.6 MG/DL — SIGNIFICANT CHANGE UP (ref 2.5–4.5)
PLATELET # BLD AUTO: 280 K/UL — SIGNIFICANT CHANGE UP (ref 150–400)
POTASSIUM SERPL-MCNC: 4.1 MMOL/L — SIGNIFICANT CHANGE UP (ref 3.5–5.3)
POTASSIUM SERPL-SCNC: 4.1 MMOL/L — SIGNIFICANT CHANGE UP (ref 3.5–5.3)
PROT SERPL-MCNC: 7.2 G/DL — SIGNIFICANT CHANGE UP (ref 6–8.3)
RBC # BLD: 4.35 M/UL — SIGNIFICANT CHANGE UP (ref 3.8–5.2)
RBC # FLD: 17.4 % — HIGH (ref 10.3–14.5)
SODIUM SERPL-SCNC: 136 MMOL/L — SIGNIFICANT CHANGE UP (ref 135–145)
WBC # BLD: 7.53 K/UL — SIGNIFICANT CHANGE UP (ref 3.8–10.5)
WBC # FLD AUTO: 7.53 K/UL — SIGNIFICANT CHANGE UP (ref 3.8–10.5)

## 2022-07-03 RX ORDER — NORTRIPTYLINE HYDROCHLORIDE 10 MG/1
10 CAPSULE ORAL
Refills: 0 | Status: DISCONTINUED | OUTPATIENT
Start: 2022-07-03 | End: 2022-07-04

## 2022-07-03 RX ADMIN — LIDOCAINE 2 PATCH: 4 CREAM TOPICAL at 19:54

## 2022-07-03 RX ADMIN — TIZANIDINE 2 MILLIGRAM(S): 4 TABLET ORAL at 01:51

## 2022-07-03 RX ADMIN — Medication 650 MILLIGRAM(S): at 01:51

## 2022-07-03 RX ADMIN — NORTRIPTYLINE HYDROCHLORIDE 10 MILLIGRAM(S): 10 CAPSULE ORAL at 22:13

## 2022-07-03 RX ADMIN — Medication 600 MILLIGRAM(S): at 22:13

## 2022-07-03 RX ADMIN — Medication 600 MILLIGRAM(S): at 13:00

## 2022-07-03 RX ADMIN — Medication 75 MILLIGRAM(S): at 05:12

## 2022-07-03 RX ADMIN — TIZANIDINE 2 MILLIGRAM(S): 4 TABLET ORAL at 09:36

## 2022-07-03 RX ADMIN — PREGABALIN 1000 MICROGRAM(S): 225 CAPSULE ORAL at 12:05

## 2022-07-03 RX ADMIN — LIDOCAINE 2 PATCH: 4 CREAM TOPICAL at 12:12

## 2022-07-03 RX ADMIN — Medication 600 MILLIGRAM(S): at 05:13

## 2022-07-03 RX ADMIN — NORTRIPTYLINE HYDROCHLORIDE 10 MILLIGRAM(S): 10 CAPSULE ORAL at 12:06

## 2022-07-03 RX ADMIN — ENOXAPARIN SODIUM 40 MILLIGRAM(S): 100 INJECTION SUBCUTANEOUS at 18:29

## 2022-07-03 RX ADMIN — Medication 100 MILLIGRAM(S): at 12:05

## 2022-07-03 RX ADMIN — TIZANIDINE 2 MILLIGRAM(S): 4 TABLET ORAL at 18:51

## 2022-07-03 RX ADMIN — Medication 650 MILLIGRAM(S): at 02:20

## 2022-07-03 RX ADMIN — GABAPENTIN 600 MILLIGRAM(S): 400 CAPSULE ORAL at 22:12

## 2022-07-03 RX ADMIN — Medication 75 MILLIGRAM(S): at 12:05

## 2022-07-03 RX ADMIN — Medication 600 MILLIGRAM(S): at 12:04

## 2022-07-03 RX ADMIN — Medication 1 MILLIGRAM(S): at 12:05

## 2022-07-03 RX ADMIN — Medication 1 TABLET(S): at 12:05

## 2022-07-03 NOTE — PROGRESS NOTE ADULT - SUBJECTIVE AND OBJECTIVE BOX
PROGRESS NOTE:     Patient is a 21y old  Female who presents with a chief complaint of Neuropathy (02 Jul 2022 07:23)      SUBJECTIVE / OVERNIGHT EVENTS:    No acute events overnight. Patient examined at bedside with no acute complaints.     Pain:  Bowel Movements:  Urination:  OOB:  PT:    REVIEW OF SYSTEMS:    CONSTITUTIONAL: No weakness, fevers or chills  EYES/ENT: No visual changes;  No vertigo or throat pain   NECK: No pain or stiffness  RESPIRATORY: No cough, wheezing, hemoptysis; No shortness of breath  CARDIOVASCULAR: No chest pain or palpitations  GASTROINTESTINAL: No abdominal or epigastric pain. No nausea, vomiting, or hematemesis; No diarrhea or constipation. No melena or hematochezia.  GENITOURINARY: No dysuria, frequency or hematuria  NEUROLOGICAL: No numbness or weakness  SKIN: No itching, rashes      MEDICATIONS  (STANDING):  cyanocobalamin 1000 MICROGram(s) Oral daily  enoxaparin Injectable 40 milliGRAM(s) SubCutaneous every 24 hours  folic acid 1 milliGRAM(s) Oral daily  gabapentin 600 milliGRAM(s) Oral <User Schedule>  ibuprofen  Tablet. 600 milliGRAM(s) Oral three times a day  influenza   Vaccine 0.5 milliLiter(s) IntraMuscular once  lidocaine   4% Patch 2 Patch Transdermal daily  multivitamin/minerals 1 Tablet(s) Oral daily  nortriptyline 20 milliGRAM(s) Oral at bedtime  pregabalin 75 milliGRAM(s) Oral <User Schedule>  pyridoxine 100 milliGRAM(s) Oral daily  thiamine 100 milliGRAM(s) Oral daily  tiZANidine 2 milliGRAM(s) Oral every 8 hours    MEDICATIONS  (PRN):  acetaminophen     Tablet .. 650 milliGRAM(s) Oral every 6 hours PRN Temp greater or equal to 38C (100.4F), Mild Pain (1 - 3)  melatonin 3 milliGRAM(s) Oral at bedtime PRN Insomnia  tiZANidine 2 milliGRAM(s) Oral every 8 hours PRN Muscle Spasm      CAPILLARY BLOOD GLUCOSE        I&O's Summary      VITALS:   T(C): 36.6 (07-03-22 @ 05:03), Max: 37.3 (07-02-22 @ 21:43)  HR: 105 (07-03-22 @ 05:03) (104 - 108)  BP: 125/83 (07-03-22 @ 05:03) (108/74 - 125/83)  RR: 18 (07-03-22 @ 05:03) (18 - 18)  SpO2: 99% (07-03-22 @ 05:03) (96% - 99%)    GENERAL: NAD, lying in bed comfortably  HEAD:  Atraumatic, normocephalic  EYES: EOMI, PERRLA, conjunctiva and sclera clear  ENT: Moist mucous membranes  NECK: Supple, no JVD  HEART: Regular rate and rhythm, no murmurs, rubs, or gallops  LUNGS: Unlabored respirations.  Clear to auscultation bilaterally, no crackles, wheezing, or rhonchi  ABDOMEN: Soft, nontender, nondistended, +BS  EXTREMITIES: 2+ peripheral pulses bilaterally. No clubbing, cyanosis, or edema  NERVOUS SYSTEM:  A&Ox3, no focal deficits   SKIN: No rashes or lesions    LABS:                        12.1   7.79  )-----------( 247      ( 02 Jul 2022 07:21 )             38.0     07-02    137  |  103  |  8   ----------------------------<  88  4.1   |  22  |  0.57    Ca    9.3      02 Jul 2022 07:21  Phos  4.5     07-02  Mg     2.1     07-02    TPro  6.8  /  Alb  3.8  /  TBili  0.4  /  DBili  x   /  AST  23  /  ALT  38  /  AlkPhos  56  07-02                RADIOLOGY & ADDITIONAL TESTS:  Results Reviewed:   Imaging Personally Reviewed:  Electrocardiogram Personally Reviewed:    COORDINATION OF CARE:  Care Discussed with Consultants/Other Providers [Y/N]:  Prior or Outpatient Records Reviewed [Y/N]:   PROGRESS NOTE:     Patient is a 21y old  Female who presents with a chief complaint of Neuropathy (02 Jul 2022 07:23)      SUBJECTIVE / OVERNIGHT EVENTS:    Patient reported 9/10 pain overnight. Patient missed last night's dose of Pamelor and did not receive ibuprofen tonight.     Pain: 9/10   Bowel Movements: regular  Urination: regular  OOB: as tolerated   PT: evaluated     REVIEW OF SYSTEMS:    CONSTITUTIONAL: No weakness, fevers or chills  EYES/ENT: No visual changes;  No vertigo or throat pain   NECK: No pain or stiffness  RESPIRATORY: No cough, wheezing, hemoptysis; No shortness of breath  CARDIOVASCULAR: No chest pain or palpitations  GASTROINTESTINAL: No abdominal or epigastric pain. No nausea, vomiting, or hematemesis; No diarrhea or constipation. No melena or hematochezia.  GENITOURINARY: No dysuria, frequency or hematuria  NEUROLOGICAL: No numbness or weakness  SKIN: No itching, rashes      MEDICATIONS  (STANDING):  cyanocobalamin 1000 MICROGram(s) Oral daily  enoxaparin Injectable 40 milliGRAM(s) SubCutaneous every 24 hours  folic acid 1 milliGRAM(s) Oral daily  gabapentin 600 milliGRAM(s) Oral <User Schedule>  ibuprofen  Tablet. 600 milliGRAM(s) Oral three times a day  influenza   Vaccine 0.5 milliLiter(s) IntraMuscular once  lidocaine   4% Patch 2 Patch Transdermal daily  multivitamin/minerals 1 Tablet(s) Oral daily  nortriptyline 20 milliGRAM(s) Oral at bedtime  pregabalin 75 milliGRAM(s) Oral <User Schedule>  pyridoxine 100 milliGRAM(s) Oral daily  thiamine 100 milliGRAM(s) Oral daily  tiZANidine 2 milliGRAM(s) Oral every 8 hours    MEDICATIONS  (PRN):  acetaminophen     Tablet .. 650 milliGRAM(s) Oral every 6 hours PRN Temp greater or equal to 38C (100.4F), Mild Pain (1 - 3)  melatonin 3 milliGRAM(s) Oral at bedtime PRN Insomnia  tiZANidine 2 milliGRAM(s) Oral every 8 hours PRN Muscle Spasm      CAPILLARY BLOOD GLUCOSE        I&O's Summary      VITALS:   T(C): 36.6 (07-03-22 @ 05:03), Max: 37.3 (07-02-22 @ 21:43)  HR: 105 (07-03-22 @ 05:03) (104 - 108)  BP: 125/83 (07-03-22 @ 05:03) (108/74 - 125/83)  RR: 18 (07-03-22 @ 05:03) (18 - 18)  SpO2: 99% (07-03-22 @ 05:03) (96% - 99%)    GENERAL: NAD, lying in bed comfortably  HEAD:  Atraumatic, normocephalic  EYES: EOMI, PERRLA, conjunctiva and sclera clear  ENT: Moist mucous membranes  NECK: Supple, no JVD  HEART: Regular rate and rhythm, no murmurs, rubs, or gallops  LUNGS: Unlabored respirations.  Clear to auscultation bilaterally, no crackles, wheezing, or rhonchi  ABDOMEN: Soft, nontender, nondistended, +BS  EXTREMITIES: 2+ peripheral pulses bilaterally. No clubbing, cyanosis, or edema  NERVOUS SYSTEM:  A&Ox3, no focal deficits   SKIN: No rashes or lesions    LABS:                        12.1   7.79  )-----------( 247      ( 02 Jul 2022 07:21 )             38.0     07-02    137  |  103  |  8   ----------------------------<  88  4.1   |  22  |  0.57    Ca    9.3      02 Jul 2022 07:21  Phos  4.5     07-02  Mg     2.1     07-02    TPro  6.8  /  Alb  3.8  /  TBili  0.4  /  DBili  x   /  AST  23  /  ALT  38  /  AlkPhos  56  07-02               PROGRESS NOTE:     Patient is a 21y old  Female who presents with a chief complaint of Neuropathy (02 Jul 2022 07:23)      SUBJECTIVE / OVERNIGHT EVENTS:    Patient reported 9/10 pain overnight. Patient missed last night's dose of Pamelor and did not receive ibuprofen tonight. Patient reports contractions in lower extremities associated with her pain.     Pain: 9/10   Bowel Movements: regular  Urination: regular  OOB: as tolerated   PT: evaluated     REVIEW OF SYSTEMS:    CONSTITUTIONAL: No weakness, fevers or chills  EYES/ENT: No visual changes;  No vertigo or throat pain   NECK: No pain or stiffness  RESPIRATORY: No cough, wheezing, hemoptysis; No shortness of breath  CARDIOVASCULAR: No chest pain or palpitations  GASTROINTESTINAL: No abdominal or epigastric pain. No nausea, vomiting, or hematemesis; No diarrhea or constipation. No melena or hematochezia.  GENITOURINARY: No dysuria, frequency or hematuria  NEUROLOGICAL: No numbness or weakness  SKIN: No itching, rashes      MEDICATIONS  (STANDING):  cyanocobalamin 1000 MICROGram(s) Oral daily  enoxaparin Injectable 40 milliGRAM(s) SubCutaneous every 24 hours  folic acid 1 milliGRAM(s) Oral daily  gabapentin 600 milliGRAM(s) Oral <User Schedule>  ibuprofen  Tablet. 600 milliGRAM(s) Oral three times a day  influenza   Vaccine 0.5 milliLiter(s) IntraMuscular once  lidocaine   4% Patch 2 Patch Transdermal daily  multivitamin/minerals 1 Tablet(s) Oral daily  nortriptyline 20 milliGRAM(s) Oral at bedtime  pregabalin 75 milliGRAM(s) Oral <User Schedule>  pyridoxine 100 milliGRAM(s) Oral daily  thiamine 100 milliGRAM(s) Oral daily  tiZANidine 2 milliGRAM(s) Oral every 8 hours    MEDICATIONS  (PRN):  acetaminophen     Tablet .. 650 milliGRAM(s) Oral every 6 hours PRN Temp greater or equal to 38C (100.4F), Mild Pain (1 - 3)  melatonin 3 milliGRAM(s) Oral at bedtime PRN Insomnia  tiZANidine 2 milliGRAM(s) Oral every 8 hours PRN Muscle Spasm      CAPILLARY BLOOD GLUCOSE        I&O's Summary      VITALS:   T(C): 36.6 (07-03-22 @ 05:03), Max: 37.3 (07-02-22 @ 21:43)  HR: 105 (07-03-22 @ 05:03) (104 - 108)  BP: 125/83 (07-03-22 @ 05:03) (108/74 - 125/83)  RR: 18 (07-03-22 @ 05:03) (18 - 18)  SpO2: 99% (07-03-22 @ 05:03) (96% - 99%)    GENERAL: NAD, lying in bed comfortably  HEAD:  Atraumatic, normocephalic  EYES: EOMI, PERRLA, conjunctiva and sclera clear  ENT: Moist mucous membranes  NECK: Supple, no JVD  HEART: Regular rate and rhythm, no murmurs, rubs, or gallops  LUNGS: Unlabored respirations.  Clear to auscultation bilaterally, no crackles, wheezing, or rhonchi  ABDOMEN: Soft, nontender, nondistended, +BS  EXTREMITIES: 2+ peripheral pulses bilaterally. No clubbing, cyanosis, or edema  NERVOUS SYSTEM:  A&Ox3, no focal deficits   SKIN: No rashes or lesions    LABS:                        12.1   7.79  )-----------( 247      ( 02 Jul 2022 07:21 )             38.0     07-02    137  |  103  |  8   ----------------------------<  88  4.1   |  22  |  0.57    Ca    9.3      02 Jul 2022 07:21  Phos  4.5     07-02  Mg     2.1     07-02    TPro  6.8  /  Alb  3.8  /  TBili  0.4  /  DBili  x   /  AST  23  /  ALT  38  /  AlkPhos  56  07-02

## 2022-07-03 NOTE — PROGRESS NOTE ADULT - PROBLEM SELECTOR PLAN 1
- Previous CT brain unremarkable. Previous LP unremarkable.   - Neurology consulted, appreciate recs.   - Increase Gabapentin to 600 mg TID.   - Ibuprofen ordered to be given with gabapentin doses.  - IV tylenol for breakthrough pain  - Pain management consulted. Appreciate recs. c/w Tizanidine 200 mg TID.   - c/w ibuprofen 600 mg TID.   - Follow-up pending laboratory studies.   - Folate level < 2.0. Will supplement.  - MRI C-spine and T-spine unremarkable.  - f/u us duplex b/l LE    - f/u Doppler u/s venous b/l LLE - Previous CT brain unremarkable. Previous LP unremarkable.   - Neurology consulted, appreciate recs.   - Increase Gabapentin to 600 mg TID.   - Ibuprofen ordered to be given with gabapentin doses.  - IV tylenol for breakthrough pain  - Pain management consulted. Appreciate recs. c/w Tizanidine 200 mg TID.   - c/w ibuprofen 600 mg TID.   - Follow-up pending laboratory studies.   - Folate level < 2.0. Will supplement.  - MRI C-spine and T-spine unremarkable.  - f/u us duplex b/l LE    - f/u Doppler u/s venous b/l LLE  - Changed - Previous CT brain unremarkable. Previous LP unremarkable.   - Neurology consulted, appreciate recs.   - Increase Gabapentin to 600 mg TID.   - Ibuprofen ordered to be given with gabapentin doses.  - IV tylenol for breakthrough pain  - Pain management consulted. Appreciate recs. c/w Tizanidine 200 mg TID.   - c/w ibuprofen 600 mg TID.   - Follow-up pending laboratory studies.   - Folate level < 2.0. Will supplement.  - MRI C-spine and T-spine unremarkable.  - f/u us duplex b/l LE    - f/u Doppler u/s venous b/l LLE  - Changed pamelor to 10 mg BID   - Considering neurology reconsult for contractions associated with pain.

## 2022-07-04 LAB
ALBUMIN SERPL ELPH-MCNC: 4.2 G/DL — SIGNIFICANT CHANGE UP (ref 3.3–5)
ALP SERPL-CCNC: 58 U/L — SIGNIFICANT CHANGE UP (ref 40–120)
ALT FLD-CCNC: 52 U/L — HIGH (ref 10–45)
ANION GAP SERPL CALC-SCNC: 14 MMOL/L — SIGNIFICANT CHANGE UP (ref 5–17)
AST SERPL-CCNC: 27 U/L — SIGNIFICANT CHANGE UP (ref 10–40)
BILIRUB SERPL-MCNC: 0.4 MG/DL — SIGNIFICANT CHANGE UP (ref 0.2–1.2)
BUN SERPL-MCNC: 9 MG/DL — SIGNIFICANT CHANGE UP (ref 7–23)
CALCIUM SERPL-MCNC: 9.4 MG/DL — SIGNIFICANT CHANGE UP (ref 8.4–10.5)
CHLORIDE SERPL-SCNC: 102 MMOL/L — SIGNIFICANT CHANGE UP (ref 96–108)
CO2 SERPL-SCNC: 22 MMOL/L — SIGNIFICANT CHANGE UP (ref 22–31)
CREAT SERPL-MCNC: 0.54 MG/DL — SIGNIFICANT CHANGE UP (ref 0.5–1.3)
EGFR: 133 ML/MIN/1.73M2 — SIGNIFICANT CHANGE UP
ERYTHROCYTE [SEDIMENTATION RATE] IN BLOOD: 53 MM/HR — HIGH (ref 0–15)
GLUCOSE SERPL-MCNC: 76 MG/DL — SIGNIFICANT CHANGE UP (ref 70–99)
HCT VFR BLD CALC: 39.2 % — SIGNIFICANT CHANGE UP (ref 34.5–45)
HGB BLD-MCNC: 12.7 G/DL — SIGNIFICANT CHANGE UP (ref 11.5–15.5)
MAGNESIUM SERPL-MCNC: 2.1 MG/DL — SIGNIFICANT CHANGE UP (ref 1.6–2.6)
MCHC RBC-ENTMCNC: 27 PG — SIGNIFICANT CHANGE UP (ref 27–34)
MCHC RBC-ENTMCNC: 32.4 GM/DL — SIGNIFICANT CHANGE UP (ref 32–36)
MCV RBC AUTO: 83.4 FL — SIGNIFICANT CHANGE UP (ref 80–100)
NRBC # BLD: 0 /100 WBCS — SIGNIFICANT CHANGE UP (ref 0–0)
PHOSPHATE SERPL-MCNC: 4.1 MG/DL — SIGNIFICANT CHANGE UP (ref 2.5–4.5)
PLATELET # BLD AUTO: 292 K/UL — SIGNIFICANT CHANGE UP (ref 150–400)
POTASSIUM SERPL-MCNC: 3.6 MMOL/L — SIGNIFICANT CHANGE UP (ref 3.5–5.3)
POTASSIUM SERPL-SCNC: 3.6 MMOL/L — SIGNIFICANT CHANGE UP (ref 3.5–5.3)
PROT SERPL-MCNC: 7.3 G/DL — SIGNIFICANT CHANGE UP (ref 6–8.3)
RBC # BLD: 4.7 M/UL — SIGNIFICANT CHANGE UP (ref 3.8–5.2)
RBC # FLD: 17.2 % — HIGH (ref 10.3–14.5)
SARS-COV-2 RNA SPEC QL NAA+PROBE: SIGNIFICANT CHANGE UP
SODIUM SERPL-SCNC: 138 MMOL/L — SIGNIFICANT CHANGE UP (ref 135–145)
WBC # BLD: 7.57 K/UL — SIGNIFICANT CHANGE UP (ref 3.8–10.5)
WBC # FLD AUTO: 7.57 K/UL — SIGNIFICANT CHANGE UP (ref 3.8–10.5)

## 2022-07-04 RX ORDER — NORTRIPTYLINE HYDROCHLORIDE 10 MG/1
10 CAPSULE ORAL DAILY
Refills: 0 | Status: DISCONTINUED | OUTPATIENT
Start: 2022-07-05 | End: 2022-07-06

## 2022-07-04 RX ORDER — NORTRIPTYLINE HYDROCHLORIDE 10 MG/1
20 CAPSULE ORAL AT BEDTIME
Refills: 0 | Status: DISCONTINUED | OUTPATIENT
Start: 2022-07-04 | End: 2022-07-05

## 2022-07-04 RX ADMIN — Medication 100 MILLIGRAM(S): at 12:10

## 2022-07-04 RX ADMIN — Medication 600 MILLIGRAM(S): at 13:06

## 2022-07-04 RX ADMIN — ENOXAPARIN SODIUM 40 MILLIGRAM(S): 100 INJECTION SUBCUTANEOUS at 17:20

## 2022-07-04 RX ADMIN — NORTRIPTYLINE HYDROCHLORIDE 20 MILLIGRAM(S): 10 CAPSULE ORAL at 21:29

## 2022-07-04 RX ADMIN — Medication 75 MILLIGRAM(S): at 06:01

## 2022-07-04 RX ADMIN — PREGABALIN 1000 MICROGRAM(S): 225 CAPSULE ORAL at 12:10

## 2022-07-04 RX ADMIN — GABAPENTIN 600 MILLIGRAM(S): 400 CAPSULE ORAL at 21:30

## 2022-07-04 RX ADMIN — Medication 1 TABLET(S): at 12:10

## 2022-07-04 RX ADMIN — Medication 600 MILLIGRAM(S): at 06:01

## 2022-07-04 RX ADMIN — Medication 600 MILLIGRAM(S): at 21:30

## 2022-07-04 RX ADMIN — Medication 1 MILLIGRAM(S): at 12:11

## 2022-07-04 RX ADMIN — LIDOCAINE 2 PATCH: 4 CREAM TOPICAL at 00:33

## 2022-07-04 RX ADMIN — NORTRIPTYLINE HYDROCHLORIDE 10 MILLIGRAM(S): 10 CAPSULE ORAL at 12:09

## 2022-07-04 RX ADMIN — Medication 75 MILLIGRAM(S): at 13:06

## 2022-07-04 NOTE — PROGRESS NOTE ADULT - PROBLEM SELECTOR PLAN 1
- Previous CT brain unremarkable. Previous LP unremarkable.   - Neurology consulted, appreciate recs.   - Increase Gabapentin to 600 mg TID.   - Ibuprofen ordered to be given with gabapentin doses.  - IV tylenol for breakthrough pain  - Pain management consulted. Appreciate recs. c/w Tizanidine 200 mg TID.   - c/w ibuprofen 600 mg TID.   - Follow-up pending laboratory studies.   - Folate level < 2.0. Will supplement.  - MRI C-spine and T-spine unremarkable.  - f/u us duplex b/l LE    - f/u Doppler u/s venous b/l LLE  - Changed pamelor to 10 mg BID   - Considering neurology reconsult for contractions associated with pain. - Previous CT brain unremarkable. Previous LP unremarkable.   - Neurology consulted, appreciate recs.   - Increase Gabapentin to 600 mg TID.   - Ibuprofen ordered to be given with gabapentin doses.  - IV tylenol for breakthrough pain  - Pain management consulted. Appreciate recs. c/w Tizanidine 200 mg TID.   - c/w ibuprofen 600 mg TID.   - Follow-up pending laboratory studies.   - Folate level < 2.0. Will supplement.  - MRI C-spine and T-spine unremarkable.  - f/u us duplex b/l LE    - f/u Doppler u/s venous b/l LLE  - Changed pamelor to 10 mg in AM and 20 mg in PM   - Per neurology, contractions and leg pain are associated with EMG findings of demyelinating neuropathy, which can be explained by the folate deficiency.   - Considering pain management re-consult

## 2022-07-04 NOTE — CONSULT NOTE ADULT - ASSESSMENT
21F bilateral feet and fingertip pain and numbness, ?peripheral neuropathy, vascular surgery consulted to r/o PAD    Recommendations:  -SUMMER/PVR (ordered)  vascular surgery recommendations pending the above study    Discussed with vascular surgery fellow    VASCULAR SURGERY  [id number]
Estela Clark is a 21 year old RH female with a past medical history of DM and HLD who presents to the ED for continued lower extremity pain and paresthesias.    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 Consider vitamin deficiency, minerals deficiency/toxicity, rheumatologic process. Additionally with recent treatment of DM and improvement of A1c can consider treatment induced neuropathy of DM.    Recs:  [] please obtain ACE, Sjogren Ab, PILO, ANCA, copper, ceruloplasmin, LFTs, SPEP, UPEP, Lyme screen, RPR, ESR, CRP, HIV, HSV, TSH, T3/T4, A1c, Vitamin B1, B6, B12, folate, Vit E, methylmalonic acid, homocysteine, RF, celiac panel, ganglioside antibodies, heavy metal screen  [] for pain may increase gabapentin to 300mg tid  [] start Nortriptyline 10mg daily  [] PT/OT  [] depending on above studies, may consider MR of the spine.    Patient to be seen and discussed with neurology attending, Dr. Landeros.   
22 yo F with PMHx of DM and HDL no longer on medication presents with bilateral foot pain and numbing for 1 month. Patient was seen by outpatient neurologist who performed EMG, results unremarkable, and sent patient to the ED on 7/21/2022 for workup to r/o GBS. CT brain w/o contrast and LP were both done during that ED visit, and both were unremarkable. Patient reports pain worsened progressively over the past 1 week, with swelling of the feet and inability to ambulate. Patient reports pain worsens upon movement and feels as if her feet are "asleep".    Patient at home takes Gabapentin 300 mg BID, and was prescribed vitamins B1, B6, B12, and multivitamin supplements.      Pt with painful peripheral neuropathy DM? chemical? vitamin deficiency?    Continue neurontin 600 mg at HS.  Start Lyrica 75 mg in AM and afternoon, first dose today.  Continue pamelor 10 mg at HS, increase as necessary.  Continue ibuprofen PRN.  Current CrCl is 225.2.    Monitor for sedation and respiratory depression.  Bowel regimen.  OOB per Medicine.      Minutes spent on total encounter: 50 minutes      [facility]  [phone]

## 2022-07-04 NOTE — CONSULT NOTE ADULT - ATTENDING COMMENTS
HPI as per resident note, personally verified by me. Patient reports mild improvement in pain with current medications. She has significant allodynia when touched, mainly on the BLE's. Also with ongoing paraesthesias. The pain is the main limiting factor in her ambulation. No focal neurologic deficits reported.    MS: Awake, alert, oriented to person, place, situation, time. Normal affect. Follows all commands. Attn/conc, recent and remote memory, fund of knowledge WNL    Language: Speech is clear, fluent. Rep/naming intact    CNs: PERRL (R = 4mm, L = 4mm). VF intact in all 4 quadrants. EOMI no nystagmus. V1-3 intact to LT. No facial asymmetry b/l. Hearing intact b/l. Symmetric palate elevation in midline. Shoulder shrug intact b/l. Tongue midline, normal movements, no atrophy.    Motor: Normal muscle bulk & tone. No noticeable tremor or seizure. No pronator drift.              Deltoid	Biceps	Triceps	   R	5	5	5	5		  L	5	5	5	5	    	H-Flex	H-Ext	K-Flex	K-Ext	D-Flex	P-Flex  R	5	5	5	5	severely limited due to pain		   L	5	5	5	5	severely limited due to pain			     Sensation: Reduced sensation below the knees bilaterally to LT and PP, 30% as compared with above the knee. Vibratory sense diminished in the LE below the knee. Position sense intact bilaterally.     Reflexes:              Biceps(C5)       BR(C6)     Triceps(C7)               Patellar(L4)    Achilles(S1)    Plantar Resp  R	2	          2	             2		        0		    not tested due to pain  L	2	          2	             2		        0		    not tested due to pain    Coordination: No dysmetria to FTN    Gait and station: Not tested due to pain    A1C 4.8%, CRP WNL, TSH WNL, B12 WNL, Folate dec 2.0    A/P:  Skin sensation disturbance  Folate deficiency  DM type 2    - Etiology for painful peripheral neuropathy is likely secondary to toxic/metabolic (low folate?) versus inflammatory process. No evidence of CNS dysfunction and no GBS as per prior work-up with unrevealing CSF. She reports some improvement in pain control with medication adjustment and this seems to be her rate limiting factor  - Agree with gabapentin 300mg PO TID. Also with nortriptyline 10mg PO bedtime would increase by 10mg each week as tolerated to goal of 30-50 mg daily for pain control  - Recommend pain management consult  - Replete folate with 1mg PO daily  - Agree with labs as per resident note  - PT/OT as tolerated  - No additional acute inpatient neurologic work-up indicated at this time. She can follow-up with Dr. Isbell as outpatient  - Continue to address above medical problems, as you are doing  - Will sign off, please call with additional questions or concerns
Bilateral lower extremity parasthesias.   On exam, bilateral calf compartments soft. palpable pedal pulses.     Arterial duplex reviewed. No arterial disease identified.  Bilateral ABIs within normal limits.     No evidence of peripheral arterial disease on physical exam or studies.   The etiology of patient's symptoms does not appear vascular in nature.  Continue workup per neurology (consider reconsulting for additional imaging/workup as patient remains in house).  Pain control per pain service.  Vascular surgery will sign off. Please reconsult as needed.

## 2022-07-04 NOTE — PROGRESS NOTE ADULT - SUBJECTIVE AND OBJECTIVE BOX
PROGRESS NOTE:     Patient is a 21y old  Female who presents with a chief complaint of Neuropathy (03 Jul 2022 07:15)      SUBJECTIVE / OVERNIGHT EVENTS:    No acute events overnight. Patient examined at bedside with no acute complaints.     Pain:  Bowel Movements:  Urination:  OOB:  PT:    REVIEW OF SYSTEMS:    CONSTITUTIONAL: No weakness, fevers or chills  EYES/ENT: No visual changes;  No vertigo or throat pain   NECK: No pain or stiffness  RESPIRATORY: No cough, wheezing, hemoptysis; No shortness of breath  CARDIOVASCULAR: No chest pain or palpitations  GASTROINTESTINAL: No abdominal or epigastric pain. No nausea, vomiting, or hematemesis; No diarrhea or constipation. No melena or hematochezia.  GENITOURINARY: No dysuria, frequency or hematuria  NEUROLOGICAL: No numbness or weakness  SKIN: No itching, rashes      MEDICATIONS  (STANDING):  cyanocobalamin 1000 MICROGram(s) Oral daily  enoxaparin Injectable 40 milliGRAM(s) SubCutaneous every 24 hours  folic acid 1 milliGRAM(s) Oral daily  gabapentin 600 milliGRAM(s) Oral <User Schedule>  ibuprofen  Tablet. 600 milliGRAM(s) Oral three times a day  influenza   Vaccine 0.5 milliLiter(s) IntraMuscular once  lidocaine   4% Patch 2 Patch Transdermal daily  multivitamin/minerals 1 Tablet(s) Oral daily  nortriptyline 10 milliGRAM(s) Oral two times a day  pregabalin 75 milliGRAM(s) Oral <User Schedule>  pyridoxine 100 milliGRAM(s) Oral daily  thiamine 100 milliGRAM(s) Oral daily    MEDICATIONS  (PRN):  acetaminophen     Tablet .. 650 milliGRAM(s) Oral every 6 hours PRN Temp greater or equal to 38C (100.4F), Mild Pain (1 - 3)  melatonin 3 milliGRAM(s) Oral at bedtime PRN Insomnia  tiZANidine 2 milliGRAM(s) Oral every 8 hours PRN Muscle Spasm      CAPILLARY BLOOD GLUCOSE        I&O's Summary      VITALS:   T(C): 36.5 (07-04-22 @ 06:12), Max: 37.3 (07-03-22 @ 21:59)  HR: 95 (07-04-22 @ 06:12) (91 - 110)  BP: 129/89 (07-04-22 @ 06:12) (124/81 - 135/94)  RR: 18 (07-04-22 @ 06:12) (18 - 18)  SpO2: 98% (07-04-22 @ 06:12) (98% - 99%)    GENERAL: NAD, lying in bed comfortably  HEAD:  Atraumatic, normocephalic  EYES: EOMI, PERRLA, conjunctiva and sclera clear  ENT: Moist mucous membranes  NECK: Supple, no JVD  HEART: Regular rate and rhythm, no murmurs, rubs, or gallops  LUNGS: Unlabored respirations.  Clear to auscultation bilaterally, no crackles, wheezing, or rhonchi  ABDOMEN: Soft, nontender, nondistended, +BS  EXTREMITIES: 2+ peripheral pulses bilaterally. No clubbing, cyanosis, or edema  NERVOUS SYSTEM:  A&Ox3, no focal deficits   SKIN: No rashes or lesions    LABS:                        12.0   7.53  )-----------( 280      ( 03 Jul 2022 07:04 )             37.1     07-03    136  |  103  |  9   ----------------------------<  82  4.1   |  21<L>  |  0.48<L>    Ca    9.4      03 Jul 2022 07:06  Phos  3.6     07-03  Mg     2.1     07-03    TPro  7.2  /  Alb  4.0  /  TBili  0.5  /  DBili  x   /  AST  21  /  ALT  46<H>  /  AlkPhos  56  07-03                RADIOLOGY & ADDITIONAL TESTS:  Results Reviewed:   Imaging Personally Reviewed:  Electrocardiogram Personally Reviewed:    COORDINATION OF CARE:  Care Discussed with Consultants/Other Providers [Y/N]:  Prior or Outpatient Records Reviewed [Y/N]:   PROGRESS NOTE:     Patient is a 21y old  Female who presents with a chief complaint of Neuropathy (03 Jul 2022 07:15)      SUBJECTIVE / OVERNIGHT EVENTS:    No acute events overnight. Patient examined at bedside. Patient reports increased pain after nighttime medications and currently experiencing 6-7/10 pain.     Pain: 6-7/10   Bowel Movements: regular   Urination: regular   OOB: as tolerated     REVIEW OF SYSTEMS:    CONSTITUTIONAL: No weakness, fevers or chills  EYES/ENT: No visual changes;  No vertigo or throat pain   NECK: No pain or stiffness  RESPIRATORY: No cough, wheezing, hemoptysis; No shortness of breath  CARDIOVASCULAR: No chest pain or palpitations  GASTROINTESTINAL: No abdominal or epigastric pain. No nausea, vomiting, or hematemesis; No diarrhea or constipation. No melena or hematochezia.  GENITOURINARY: No dysuria, frequency or hematuria  NEUROLOGICAL: b/l leg pain   SKIN: No itching, rashes      MEDICATIONS  (STANDING):  cyanocobalamin 1000 MICROGram(s) Oral daily  enoxaparin Injectable 40 milliGRAM(s) SubCutaneous every 24 hours  folic acid 1 milliGRAM(s) Oral daily  gabapentin 600 milliGRAM(s) Oral <User Schedule>  ibuprofen  Tablet. 600 milliGRAM(s) Oral three times a day  influenza   Vaccine 0.5 milliLiter(s) IntraMuscular once  lidocaine   4% Patch 2 Patch Transdermal daily  multivitamin/minerals 1 Tablet(s) Oral daily  nortriptyline 10 milliGRAM(s) Oral two times a day  pregabalin 75 milliGRAM(s) Oral <User Schedule>  pyridoxine 100 milliGRAM(s) Oral daily  thiamine 100 milliGRAM(s) Oral daily    MEDICATIONS  (PRN):  acetaminophen     Tablet .. 650 milliGRAM(s) Oral every 6 hours PRN Temp greater or equal to 38C (100.4F), Mild Pain (1 - 3)  melatonin 3 milliGRAM(s) Oral at bedtime PRN Insomnia  tiZANidine 2 milliGRAM(s) Oral every 8 hours PRN Muscle Spasm      CAPILLARY BLOOD GLUCOSE        I&O's Summary      VITALS:   T(C): 36.5 (07-04-22 @ 06:12), Max: 37.3 (07-03-22 @ 21:59)  HR: 95 (07-04-22 @ 06:12) (91 - 110)  BP: 129/89 (07-04-22 @ 06:12) (124/81 - 135/94)  RR: 18 (07-04-22 @ 06:12) (18 - 18)  SpO2: 98% (07-04-22 @ 06:12) (98% - 99%)    GENERAL: NAD, lying in bed comfortably  HEAD:  Atraumatic, normocephalic  EYES: EOMI, PERRLA, conjunctiva and sclera clear  ENT: Moist mucous membranes  NECK: Supple, no JVD  HEART: Regular rate and rhythm, no murmurs, rubs, or gallops  LUNGS: Unlabored respirations.  Clear to auscultation bilaterally, no crackles, wheezing, or rhonchi  ABDOMEN: Soft, nontender, nondistended, +BS  EXTREMITIES: 2+ peripheral pulses bilaterally. No clubbing, cyanosis, or edema  NERVOUS SYSTEM:  A&Ox3, no focal deficits   SKIN: No rashes or lesions    LABS:                        12.0   7.53  )-----------( 280      ( 03 Jul 2022 07:04 )             37.1     07-03    136  |  103  |  9   ----------------------------<  82  4.1   |  21<L>  |  0.48<L>    Ca    9.4      03 Jul 2022 07:06  Phos  3.6     07-03  Mg     2.1     07-03    TPro  7.2  /  Alb  4.0  /  TBili  0.5  /  DBili  x   /  AST  21  /  ALT  46<H>  /  AlkPhos  56  07-03

## 2022-07-04 NOTE — CONSULT NOTE ADULT - ATTENDING SUPERVISION STATEMENT
I concur with the Admission Order and I certify that services are provided in accordance with Section 42 CFR § 412.3
Resident
Resident

## 2022-07-04 NOTE — CONSULT NOTE ADULT - SUBJECTIVE AND OBJECTIVE BOX
General Surgery Consult  Consulting surgical team: VASCULAR SURGERY  Consulting attending: Dr. Olmedo    HPI:  21F hx of dm2 diet-controlled here with 1 month of worsening bilateral foot pain progressed to bilateral fingertips numbness / pain, seen by neurology with abnormal EMG consistent with demyelinating disease possibly 2/2 folate deficiency admitted to medicine. patient also feels like her bilateral calves are "asleep" but no tenderness  palpable pedal pulses, no LE open wounds  vascular surgery consulted to r/o PAD      PAST MEDICAL HISTORY:  DM (diabetes mellitus), type 2    High cholesterol        PAST SURGICAL HISTORY:  No significant past surgical history        MEDICATIONS:  acetaminophen     Tablet .. 650 milliGRAM(s) Oral every 6 hours PRN  cyanocobalamin 1000 MICROGram(s) Oral daily  enoxaparin Injectable 40 milliGRAM(s) SubCutaneous every 24 hours  folic acid 1 milliGRAM(s) Oral daily  gabapentin 600 milliGRAM(s) Oral <User Schedule>  ibuprofen  Tablet. 600 milliGRAM(s) Oral three times a day  influenza   Vaccine 0.5 milliLiter(s) IntraMuscular once  lidocaine   4% Patch 2 Patch Transdermal daily  melatonin 3 milliGRAM(s) Oral at bedtime PRN  multivitamin/minerals 1 Tablet(s) Oral daily  nortriptyline 20 milliGRAM(s) Oral at bedtime  pregabalin 75 milliGRAM(s) Oral <User Schedule>  pyridoxine 100 milliGRAM(s) Oral daily  thiamine 100 milliGRAM(s) Oral daily  tiZANidine 2 milliGRAM(s) Oral every 8 hours PRN      ALLERGIES:  No Known Allergies      VITALS & I/Os:  Vital Signs Last 24 Hrs  T(C): 36.7 (04 Jul 2022 14:04), Max: 37.3 (03 Jul 2022 21:59)  T(F): 98 (04 Jul 2022 14:04), Max: 99.1 (03 Jul 2022 21:59)  HR: 95 (04 Jul 2022 14:04) (95 - 110)  BP: 131/88 (04 Jul 2022 14:04) (129/89 - 135/94)  BP(mean): --  RR: 18 (04 Jul 2022 14:04) (18 - 18)  SpO2: 98% (04 Jul 2022 14:04) (98% - 99%)    I&O's Summary    04 Jul 2022 07:01  -  04 Jul 2022 17:59  --------------------------------------------------------  IN: 480 mL / OUT: 0 mL / NET: 480 mL        PHYSICAL EXAM:  General: No acute distress  Respiratory: Nonlabored  Cardiovascular: appears well perfused  Abdominal: Soft, nondistended, nontender. No rebound or guarding. No organomegaly, no palpable mass.  Extremities: Warm  bilateral toes and fingertips "pins and needles"  no LE edema  +hair bilateral LE  no wounds  Pulse exam:  faintly palpable bilateral DP  palpable PT bilaterally  bilateral femoral 2+    LABS:                        12.7   7.57  )-----------( 292 ( 04 Jul 2022 07:17 )             39.2     07-04    138  |  102  |  9   ----------------------------<  76  3.6   |  22  |  0.54    Ca    9.4      04 Jul 2022 07:16  Phos  4.1     07-04  Mg     2.1     07-04    TPro  7.3  /  Alb  4.2  /  TBili  0.4  /  DBili  x   /  AST  27  /  ALT  52<H>  /  AlkPhos  58  07-04    Lactate:                  IMAGING:

## 2022-07-05 LAB
ALBUMIN SERPL ELPH-MCNC: 3.8 G/DL — SIGNIFICANT CHANGE UP (ref 3.3–5)
ALP SERPL-CCNC: 52 U/L — SIGNIFICANT CHANGE UP (ref 40–120)
ALT FLD-CCNC: 52 U/L — HIGH (ref 10–45)
ANION GAP SERPL CALC-SCNC: 14 MMOL/L — SIGNIFICANT CHANGE UP (ref 5–17)
AST SERPL-CCNC: 23 U/L — SIGNIFICANT CHANGE UP (ref 10–40)
BILIRUB SERPL-MCNC: 0.4 MG/DL — SIGNIFICANT CHANGE UP (ref 0.2–1.2)
BUN SERPL-MCNC: 13 MG/DL — SIGNIFICANT CHANGE UP (ref 7–23)
CALCIUM SERPL-MCNC: 9.2 MG/DL — SIGNIFICANT CHANGE UP (ref 8.4–10.5)
CHLORIDE SERPL-SCNC: 104 MMOL/L — SIGNIFICANT CHANGE UP (ref 96–108)
CO2 SERPL-SCNC: 23 MMOL/L — SIGNIFICANT CHANGE UP (ref 22–31)
CREAT SERPL-MCNC: 0.51 MG/DL — SIGNIFICANT CHANGE UP (ref 0.5–1.3)
EGFR: 135 ML/MIN/1.73M2 — SIGNIFICANT CHANGE UP
GLUCOSE SERPL-MCNC: 82 MG/DL — SIGNIFICANT CHANGE UP (ref 70–99)
HCT VFR BLD CALC: 38 % — SIGNIFICANT CHANGE UP (ref 34.5–45)
HGB BLD-MCNC: 12.2 G/DL — SIGNIFICANT CHANGE UP (ref 11.5–15.5)
MAGNESIUM SERPL-MCNC: 2.1 MG/DL — SIGNIFICANT CHANGE UP (ref 1.6–2.6)
MCHC RBC-ENTMCNC: 27.1 PG — SIGNIFICANT CHANGE UP (ref 27–34)
MCHC RBC-ENTMCNC: 32.1 GM/DL — SIGNIFICANT CHANGE UP (ref 32–36)
MCV RBC AUTO: 84.3 FL — SIGNIFICANT CHANGE UP (ref 80–100)
NRBC # BLD: 0 /100 WBCS — SIGNIFICANT CHANGE UP (ref 0–0)
PHOSPHATE SERPL-MCNC: 4.3 MG/DL — SIGNIFICANT CHANGE UP (ref 2.5–4.5)
PLATELET # BLD AUTO: 274 K/UL — SIGNIFICANT CHANGE UP (ref 150–400)
POTASSIUM SERPL-MCNC: 3.7 MMOL/L — SIGNIFICANT CHANGE UP (ref 3.5–5.3)
POTASSIUM SERPL-SCNC: 3.7 MMOL/L — SIGNIFICANT CHANGE UP (ref 3.5–5.3)
PROT SERPL-MCNC: 6.7 G/DL — SIGNIFICANT CHANGE UP (ref 6–8.3)
RBC # BLD: 4.51 M/UL — SIGNIFICANT CHANGE UP (ref 3.8–5.2)
RBC # FLD: 17.2 % — HIGH (ref 10.3–14.5)
SODIUM SERPL-SCNC: 141 MMOL/L — SIGNIFICANT CHANGE UP (ref 135–145)
WBC # BLD: 7.52 K/UL — SIGNIFICANT CHANGE UP (ref 3.8–10.5)
WBC # FLD AUTO: 7.52 K/UL — SIGNIFICANT CHANGE UP (ref 3.8–10.5)

## 2022-07-05 PROCEDURE — 93010 ELECTROCARDIOGRAM REPORT: CPT

## 2022-07-05 PROCEDURE — 93925 LOWER EXTREMITY STUDY: CPT | Mod: 26

## 2022-07-05 PROCEDURE — 99231 SBSQ HOSP IP/OBS SF/LOW 25: CPT

## 2022-07-05 PROCEDURE — 93923 UPR/LXTR ART STDY 3+ LVLS: CPT | Mod: 26

## 2022-07-05 PROCEDURE — 99221 1ST HOSP IP/OBS SF/LOW 40: CPT

## 2022-07-05 RX ORDER — LIDOCAINE AND PRILOCAINE CREAM 25; 25 MG/G; MG/G
1 CREAM TOPICAL DAILY
Refills: 0 | Status: DISCONTINUED | OUTPATIENT
Start: 2022-07-05 | End: 2022-07-08

## 2022-07-05 RX ORDER — FOLIC ACID 0.8 MG
5 TABLET ORAL DAILY
Refills: 0 | Status: DISCONTINUED | OUTPATIENT
Start: 2022-07-05 | End: 2022-07-08

## 2022-07-05 RX ORDER — SENNA PLUS 8.6 MG/1
1 TABLET ORAL DAILY
Refills: 0 | Status: DISCONTINUED | OUTPATIENT
Start: 2022-07-05 | End: 2022-07-08

## 2022-07-05 RX ORDER — TRAMADOL HYDROCHLORIDE 50 MG/1
25 TABLET ORAL EVERY 6 HOURS
Refills: 0 | Status: DISCONTINUED | OUTPATIENT
Start: 2022-07-05 | End: 2022-07-08

## 2022-07-05 RX ORDER — TRAMADOL HYDROCHLORIDE 50 MG/1
25 TABLET ORAL EVERY 6 HOURS
Refills: 0 | Status: DISCONTINUED | OUTPATIENT
Start: 2022-07-05 | End: 2022-07-05

## 2022-07-05 RX ADMIN — Medication 600 MILLIGRAM(S): at 05:06

## 2022-07-05 RX ADMIN — Medication 600 MILLIGRAM(S): at 14:09

## 2022-07-05 RX ADMIN — Medication 100 MILLIGRAM(S): at 21:31

## 2022-07-05 RX ADMIN — TIZANIDINE 2 MILLIGRAM(S): 4 TABLET ORAL at 23:45

## 2022-07-05 RX ADMIN — Medication 600 MILLIGRAM(S): at 14:50

## 2022-07-05 RX ADMIN — TIZANIDINE 2 MILLIGRAM(S): 4 TABLET ORAL at 12:21

## 2022-07-05 RX ADMIN — NORTRIPTYLINE HYDROCHLORIDE 10 MILLIGRAM(S): 10 CAPSULE ORAL at 12:21

## 2022-07-05 RX ADMIN — Medication 5 MILLIGRAM(S): at 15:51

## 2022-07-05 RX ADMIN — Medication 1 TABLET(S): at 12:21

## 2022-07-05 RX ADMIN — TRAMADOL HYDROCHLORIDE 25 MILLIGRAM(S): 50 TABLET ORAL at 19:10

## 2022-07-05 RX ADMIN — ENOXAPARIN SODIUM 40 MILLIGRAM(S): 100 INJECTION SUBCUTANEOUS at 18:22

## 2022-07-05 RX ADMIN — Medication 100 MILLIGRAM(S): at 12:21

## 2022-07-05 RX ADMIN — Medication 75 MILLIGRAM(S): at 14:10

## 2022-07-05 RX ADMIN — PREGABALIN 1000 MICROGRAM(S): 225 CAPSULE ORAL at 12:21

## 2022-07-05 RX ADMIN — TRAMADOL HYDROCHLORIDE 25 MILLIGRAM(S): 50 TABLET ORAL at 18:24

## 2022-07-05 RX ADMIN — Medication 75 MILLIGRAM(S): at 05:06

## 2022-07-05 RX ADMIN — Medication 100 MILLIGRAM(S): at 12:22

## 2022-07-05 RX ADMIN — Medication 1 MILLIGRAM(S): at 12:21

## 2022-07-05 RX ADMIN — Medication 600 MILLIGRAM(S): at 21:31

## 2022-07-05 NOTE — PROVIDER CONTACT NOTE (OTHER) - ASSESSMENT
no s/s of distress
patient c/o b/l foot pain, exacerbated by activity. pt describes pain as "tingling and burning at the same time". pt visibly in distress and is crying. pt endorses increase in pain with movement
pt crying, complaining of leg pain 9/10
pt still complaining of 8/10 pain
show

## 2022-07-05 NOTE — PROGRESS NOTE ADULT - SUBJECTIVE AND OBJECTIVE BOX
CHIEF COMPLAINT:  Patient is a 21y old  Female who presents with a chief complaint of Neuropathy (01 Jul 2022 06:44)    Interval HPI:   21F PMHx DM and HLD, no longer on medication.    Admitted with B/L peripheral foot neuropathy - pain, burning, numbness, pins and needles  Possibly 2/2 folate deficiency    Out- patient pain regimen: Neurontin 300 mg TID  Out Patient Pain Management provider: none    Pt seen lying in bed, sleeping, easy to rouse. Reports inability to sleep at night 2/2 pain. Still with neuropathic pain. Reports improvement in cramping/spasms of toes. No longer feeling effect from Pamelor, despite increase in dose. Some effect from Gabapentinoids.   Have been trying to avoid opioids, as they're not indicated for neuropathic pain, but given her refractory pain discussed trialing Tramadol as it is a mild opioid similar in structure to antidepressant and therefor beneficial for neuropathic pain.     PHYSICAL EXAM  GENERAL: seen at bedside; NAD; well developed, well groomed; no signs of toxicity  HEENT: head atraumatic, normocephalic; anicteric; speech clear and fluent  NEURO: A + O X 3; good concentration; + allodynia and hyperesthesias of B/L feet   GI: (+)BM  :  voiding  EXTREMITIES: moving all extremities; independent ambulator  SKIN: no rashes, lesions    PSYCH: affect normal; good eye contact; appears depressed      T(C): 36.8 (07-05-22 @ 13:57)  HR: 98 (07-05-22 @ 13:57)  BP: 128/90 (07-05-22 @ 13:57)  RR: 18 (07-05-22 @ 13:57)  SpO2: 99% (07-05-22 @ 13:57)      MEDICATIONS  (STANDING):  cyanocobalamin 1000 MICROGram(s) Oral daily  enoxaparin Injectable 40 milliGRAM(s) SubCutaneous every 24 hours  folic acid 5 milliGRAM(s) Oral daily  gabapentin 600 milliGRAM(s) Oral <User Schedule>  ibuprofen  Tablet. 600 milliGRAM(s) Oral three times a day  influenza   Vaccine 0.5 milliLiter(s) IntraMuscular once  lidocaine   4% Patch 2 Patch Transdermal daily  multivitamin/minerals 1 Tablet(s) Oral daily  nortriptyline 10 milliGRAM(s) Oral daily  nortriptyline 20 milliGRAM(s) Oral at bedtime  pregabalin 75 milliGRAM(s) Oral <User Schedule>  pyridoxine 100 milliGRAM(s) Oral daily  thiamine 100 milliGRAM(s) Oral daily    MEDICATIONS  (PRN):  acetaminophen     Tablet .. 650 milliGRAM(s) Oral every 6 hours PRN Temp greater or equal to 38C (100.4F), Mild Pain (1 - 3)  melatonin 3 milliGRAM(s) Oral at bedtime PRN Insomnia  tiZANidine 2 milliGRAM(s) Oral every 8 hours PRN Muscle Spasm      Allergies    No Known Allergies        Pertinent labs, radiology, additional studies:  Reviewed                          12.2   7.52  )-----------( 274      ( 05 Jul 2022 07:42 )             38.0       07-05    141  |  104  |  13  ----------------------------<  82  3.7   |  23  |  0.51    Ca    9.2      05 Jul 2022 07:42  Phos  4.3     07-05  Mg     2.1     07-05    TPro  6.7  /  Alb  3.8  /  TBili  0.4  /  DBili  x   /  AST  23  /  ALT  52<H>  /  AlkPhos  52  07-05

## 2022-07-05 NOTE — PROGRESS NOTE ADULT - SUBJECTIVE AND OBJECTIVE BOX
PROGRESS NOTE:     Patient is a 21y old  Female who presents with a chief complaint of Neuropathy (04 Jul 2022 17:58)      SUBJECTIVE / OVERNIGHT EVENTS:    No acute events overnight. Patient examined at bedside with no acute complaints.     Pain:  Bowel Movements:  Urination:  OOB:  PT:    REVIEW OF SYSTEMS:    CONSTITUTIONAL: No weakness, fevers or chills  EYES/ENT: No visual changes;  No vertigo or throat pain   NECK: No pain or stiffness  RESPIRATORY: No cough, wheezing, hemoptysis; No shortness of breath  CARDIOVASCULAR: No chest pain or palpitations  GASTROINTESTINAL: No abdominal or epigastric pain. No nausea, vomiting, or hematemesis; No diarrhea or constipation. No melena or hematochezia.  GENITOURINARY: No dysuria, frequency or hematuria  NEUROLOGICAL: No numbness or weakness  SKIN: No itching, rashes      MEDICATIONS  (STANDING):  cyanocobalamin 1000 MICROGram(s) Oral daily  enoxaparin Injectable 40 milliGRAM(s) SubCutaneous every 24 hours  folic acid 1 milliGRAM(s) Oral daily  gabapentin 600 milliGRAM(s) Oral <User Schedule>  ibuprofen  Tablet. 600 milliGRAM(s) Oral three times a day  influenza   Vaccine 0.5 milliLiter(s) IntraMuscular once  lidocaine   4% Patch 2 Patch Transdermal daily  multivitamin/minerals 1 Tablet(s) Oral daily  nortriptyline 10 milliGRAM(s) Oral daily  nortriptyline 20 milliGRAM(s) Oral at bedtime  pregabalin 75 milliGRAM(s) Oral <User Schedule>  pyridoxine 100 milliGRAM(s) Oral daily  thiamine 100 milliGRAM(s) Oral daily    MEDICATIONS  (PRN):  acetaminophen     Tablet .. 650 milliGRAM(s) Oral every 6 hours PRN Temp greater or equal to 38C (100.4F), Mild Pain (1 - 3)  melatonin 3 milliGRAM(s) Oral at bedtime PRN Insomnia  tiZANidine 2 milliGRAM(s) Oral every 8 hours PRN Muscle Spasm      CAPILLARY BLOOD GLUCOSE        I&O's Summary    04 Jul 2022 07:01  -  05 Jul 2022 06:49  --------------------------------------------------------  IN: 480 mL / OUT: 0 mL / NET: 480 mL        VITALS:   T(C): 36.7 (07-05-22 @ 04:19), Max: 37.3 (07-04-22 @ 21:48)  HR: 108 (07-05-22 @ 05:12) (95 - 113)  BP: 125/89 (07-05-22 @ 04:19) (125/89 - 138/95)  RR: 18 (07-05-22 @ 04:19) (18 - 18)  SpO2: 99% (07-05-22 @ 04:19) (98% - 99%)    GENERAL: NAD, lying in bed comfortably  HEAD:  Atraumatic, normocephalic  EYES: EOMI, PERRLA, conjunctiva and sclera clear  ENT: Moist mucous membranes  NECK: Supple, no JVD  HEART: Regular rate and rhythm, no murmurs, rubs, or gallops  LUNGS: Unlabored respirations.  Clear to auscultation bilaterally, no crackles, wheezing, or rhonchi  ABDOMEN: Soft, nontender, nondistended, +BS  EXTREMITIES: 2+ peripheral pulses bilaterally. No clubbing, cyanosis, or edema  NERVOUS SYSTEM:  A&Ox3, no focal deficits   SKIN: No rashes or lesions    LABS:                        12.7   7.57  )-----------( 292      ( 04 Jul 2022 07:17 )             39.2     07-04    138  |  102  |  9   ----------------------------<  76  3.6   |  22  |  0.54    Ca    9.4      04 Jul 2022 07:16  Phos  4.1     07-04  Mg     2.1     07-04    TPro  7.3  /  Alb  4.2  /  TBili  0.4  /  DBili  x   /  AST  27  /  ALT  52<H>  /  AlkPhos  58  07-04                RADIOLOGY & ADDITIONAL TESTS:  Results Reviewed:   Imaging Personally Reviewed:  Electrocardiogram Personally Reviewed:    COORDINATION OF CARE:  Care Discussed with Consultants/Other Providers [Y/N]:  Prior or Outpatient Records Reviewed [Y/N]:   PROGRESS NOTE:     Patient is a 21y old  Female who presents with a chief complaint of Neuropathy (04 Jul 2022 17:58)      SUBJECTIVE / OVERNIGHT EVENTS:    No acute events overnight. Patient examined at bedside with severe pain 9/10 in severity with loss of sleep.     Pain:9/10   Bowel Movements: regular  Urination: regular  OOB: as tolerated   PT: following     REVIEW OF SYSTEMS:    CONSTITUTIONAL: In pain   EYES/ENT: No visual changes;  No vertigo or throat pain   NECK: No pain or stiffness  RESPIRATORY: No cough, wheezing, hemoptysis; No shortness of breath  CARDIOVASCULAR: No chest pain or palpitations  GASTROINTESTINAL: No abdominal or epigastric pain. No nausea, vomiting, or hematemesis; No diarrhea or constipation. No melena or hematochezia.  GENITOURINARY: No dysuria, frequency or hematuria  NEUROLOGICAL: + numbness + weakness + neuropathy   SKIN: No itching, rashes      MEDICATIONS  (STANDING):  cyanocobalamin 1000 MICROGram(s) Oral daily  enoxaparin Injectable 40 milliGRAM(s) SubCutaneous every 24 hours  folic acid 1 milliGRAM(s) Oral daily  gabapentin 600 milliGRAM(s) Oral <User Schedule>  ibuprofen  Tablet. 600 milliGRAM(s) Oral three times a day  influenza   Vaccine 0.5 milliLiter(s) IntraMuscular once  lidocaine   4% Patch 2 Patch Transdermal daily  multivitamin/minerals 1 Tablet(s) Oral daily  nortriptyline 10 milliGRAM(s) Oral daily  nortriptyline 20 milliGRAM(s) Oral at bedtime  pregabalin 75 milliGRAM(s) Oral <User Schedule>  pyridoxine 100 milliGRAM(s) Oral daily  thiamine 100 milliGRAM(s) Oral daily    MEDICATIONS  (PRN):  acetaminophen     Tablet .. 650 milliGRAM(s) Oral every 6 hours PRN Temp greater or equal to 38C (100.4F), Mild Pain (1 - 3)  melatonin 3 milliGRAM(s) Oral at bedtime PRN Insomnia  tiZANidine 2 milliGRAM(s) Oral every 8 hours PRN Muscle Spasm      CAPILLARY BLOOD GLUCOSE        I&O's Summary    04 Jul 2022 07:01  -  05 Jul 2022 06:49  --------------------------------------------------------  IN: 480 mL / OUT: 0 mL / NET: 480 mL        VITALS:   T(C): 36.7 (07-05-22 @ 04:19), Max: 37.3 (07-04-22 @ 21:48)  HR: 108 (07-05-22 @ 05:12) (95 - 113)  BP: 125/89 (07-05-22 @ 04:19) (125/89 - 138/95)  RR: 18 (07-05-22 @ 04:19) (18 - 18)  SpO2: 99% (07-05-22 @ 04:19) (98% - 99%)    GENERAL: in pain, lying in bed   HEAD:  Atraumatic, normocephalic  EYES: EOMI, PERRLA, conjunctiva and sclera clear  ENT: Moist mucous membranes  NECK: Supple, no JVD  HEART: Regular rate and rhythm, no murmurs, rubs, or gallops  LUNGS: Unlabored respirations.  Clear to auscultation bilaterally, no crackles, wheezing, or rhonchi  ABDOMEN: Soft, nontender, nondistended, +BS  EXTREMITIES: 2+ peripheral pulses bilaterally. No clubbing, cyanosis, or edema  NERVOUS SYSTEM:  neuropathic pain. A&O x 3   SKIN: No rashes or lesions    LABS:                        12.7   7.57  )-----------( 292      ( 04 Jul 2022 07:17 )             39.2     07-04    138  |  102  |  9   ----------------------------<  76  3.6   |  22  |  0.54    Ca    9.4      04 Jul 2022 07:16  Phos  4.1     07-04  Mg     2.1     07-04    TPro  7.3  /  Alb  4.2  /  TBili  0.4  /  DBili  x   /  AST  27  /  ALT  52<H>  /  AlkPhos  58  07-04                RADIOLOGY & ADDITIONAL TESTS:  Results Reviewed:     Comprehensive Metabolic Panel in AM (07.05.22 @ 07:42)   Sodium, Serum: 141 mmol/L   Potassium, Serum: 3.7 mmol/L   Chloride, Serum: 104 mmol/L   Carbon Dioxide, Serum: 23 mmol/L   Anion Gap, Serum: 14 mmol/L   Blood Urea Nitrogen, Serum: 13 mg/dL   Creatinine, Serum: 0.51 mg/dL   Glucose, Serum: 82 mg/dL   Calcium, Total Serum: 9.2 mg/dL   Protein Total, Serum: 6.7 g/dL   Albumin, Serum: 3.8 g/dL   Bilirubin Total, Serum: 0.4 mg/dL   Alkaline Phosphatase, Serum: 52 U/L   Aspartate Aminotransferase (AST/SGOT): 23 U/L   Alanine Aminotransferase (ALT/SGPT): 52 U/L   eGFR: 135    Imaging Personally Reviewed:  Electrocardiogram Personally Reviewed:    < from: 12 Lead ECG (06.28.22 @ 02:12) >    Ventricular Rate 90 BPM    Atrial Rate 90 BPM    P-R Interval 144 ms    QRS Duration 76 ms    Q-T Interval 388 ms    QTC Calculation(Bazett) 474 ms    P Axis 52 degrees    R Axis 15 degrees    T Axis 23 degrees    Diagnosis Line NORMAL SINUS RHYTHM  CANNOT RULE OUT ANTERIOR INFARCT , AGE UNDETERMINED  ABNORMAL ECG  NO PREVIOUS ECGS AVAILABLE  Confirmed by GENNY NICKERSON PERWAIZ ([id number]) on 6/30/2022 9:29:20 AM    < end of copied text >        COORDINATION OF CARE:  Care Discussed with Consultants/Other Providers [Y/N]: Y - pain management    PROGRESS NOTE:     Patient is a 21y old  Female who presents with a chief complaint of Neuropathy (04 Jul 2022 17:58)      SUBJECTIVE / OVERNIGHT EVENTS:    No acute events overnight. Patient examined at bedside with severe pain 9/10 in severity with loss of sleep.     Pain:9/10   Bowel Movements: regular  Urination: regular  OOB: as tolerated   PT: following     REVIEW OF SYSTEMS:    CONSTITUTIONAL: In pain   EYES/ENT: No visual changes;  No vertigo or throat pain   NECK: No pain or stiffness  RESPIRATORY: No cough, wheezing, hemoptysis; No shortness of breath  CARDIOVASCULAR: No chest pain or palpitations  GASTROINTESTINAL: No abdominal or epigastric pain. No nausea, vomiting, or hematemesis; No diarrhea or constipation. No melena or hematochezia.  GENITOURINARY: No dysuria, frequency or hematuria  NEUROLOGICAL: + numbness + weakness + neuropathy   SKIN: No itching, rashes      MEDICATIONS  (STANDING):  cyanocobalamin 1000 MICROGram(s) Oral daily  enoxaparin Injectable 40 milliGRAM(s) SubCutaneous every 24 hours  folic acid 1 milliGRAM(s) Oral daily  gabapentin 600 milliGRAM(s) Oral <User Schedule>  ibuprofen  Tablet. 600 milliGRAM(s) Oral three times a day  influenza   Vaccine 0.5 milliLiter(s) IntraMuscular once  lidocaine   4% Patch 2 Patch Transdermal daily  multivitamin/minerals 1 Tablet(s) Oral daily  nortriptyline 10 milliGRAM(s) Oral daily  nortriptyline 20 milliGRAM(s) Oral at bedtime  pregabalin 75 milliGRAM(s) Oral <User Schedule>  pyridoxine 100 milliGRAM(s) Oral daily  thiamine 100 milliGRAM(s) Oral daily    MEDICATIONS  (PRN):  acetaminophen     Tablet .. 650 milliGRAM(s) Oral every 6 hours PRN Temp greater or equal to 38C (100.4F), Mild Pain (1 - 3)  melatonin 3 milliGRAM(s) Oral at bedtime PRN Insomnia  tiZANidine 2 milliGRAM(s) Oral every 8 hours PRN Muscle Spasm      CAPILLARY BLOOD GLUCOSE        I&O's Summary    04 Jul 2022 07:01  -  05 Jul 2022 06:49  --------------------------------------------------------  IN: 480 mL / OUT: 0 mL / NET: 480 mL        VITALS:   T(C): 36.7 (07-05-22 @ 04:19), Max: 37.3 (07-04-22 @ 21:48)  HR: 108 (07-05-22 @ 05:12) (95 - 113)  BP: 125/89 (07-05-22 @ 04:19) (125/89 - 138/95)  RR: 18 (07-05-22 @ 04:19) (18 - 18)  SpO2: 99% (07-05-22 @ 04:19) (98% - 99%)    GENERAL: in pain, lying in bed   HEAD:  Atraumatic, normocephalic  EYES: EOMI, PERRLA, conjunctiva and sclera clear  ENT: Moist mucous membranes  NECK: Supple, no JVD  HEART: Regular rate and rhythm, no murmurs, rubs, or gallops  LUNGS: Unlabored respirations.  Clear to auscultation bilaterally, no crackles, wheezing, or rhonchi  ABDOMEN: Soft, nontender, nondistended, +BS  EXTREMITIES: 2+ peripheral pulses bilaterally. No clubbing, cyanosis, or edema  NERVOUS SYSTEM:  neuropathic pain. A&O x 3   SKIN: No rashes or lesions    LABS:                        12.7   7.57  )-----------( 292      ( 04 Jul 2022 07:17 )             39.2     07-04    138  |  102  |  9   ----------------------------<  76  3.6   |  22  |  0.54    Ca    9.4      04 Jul 2022 07:16  Phos  4.1     07-04  Mg     2.1     07-04    TPro  7.3  /  Alb  4.2  /  TBili  0.4  /  DBili  x   /  AST  27  /  ALT  52<H>  /  AlkPhos  58  07-04                RADIOLOGY & ADDITIONAL TESTS:  Results Reviewed:     Comprehensive Metabolic Panel in AM (07.05.22 @ 07:42)   Sodium, Serum: 141 mmol/L   Potassium, Serum: 3.7 mmol/L   Chloride, Serum: 104 mmol/L   Carbon Dioxide, Serum: 23 mmol/L   Anion Gap, Serum: 14 mmol/L   Blood Urea Nitrogen, Serum: 13 mg/dL   Creatinine, Serum: 0.51 mg/dL   Glucose, Serum: 82 mg/dL   Calcium, Total Serum: 9.2 mg/dL   Protein Total, Serum: 6.7 g/dL   Albumin, Serum: 3.8 g/dL   Bilirubin Total, Serum: 0.4 mg/dL   Alkaline Phosphatase, Serum: 52 U/L   Aspartate Aminotransferase (AST/SGOT): 23 U/L   Alanine Aminotransferase (ALT/SGPT): 52 U/L   eGFR: 135    Sedimentation Rate, Erythrocyte (07.04.22 @ 07:17)   Sedimentation Rate, Erythrocyte: 53 mm/hr     Imaging Personally Reviewed:  Electrocardiogram Personally Reviewed:    < from: 12 Lead ECG (06.28.22 @ 02:12) >    Ventricular Rate 90 BPM    Atrial Rate 90 BPM    P-R Interval 144 ms    QRS Duration 76 ms    Q-T Interval 388 ms    QTC Calculation(Bazett) 474 ms    P Axis 52 degrees    R Axis 15 degrees    T Axis 23 degrees    Diagnosis Line NORMAL SINUS RHYTHM  CANNOT RULE OUT ANTERIOR INFARCT , AGE UNDETERMINED  ABNORMAL ECG  NO PREVIOUS ECGS AVAILABLE  Confirmed by GENNY NICKERSON PERWAIZ ([id number]) on 6/30/2022 9:29:20 AM    < end of copied text >        COORDINATION OF CARE:  Care Discussed with Consultants/Other Providers [Y/N]: Y - pain management

## 2022-07-05 NOTE — PROGRESS NOTE ADULT - PROBLEM SELECTOR PLAN 1
- Previous CT brain unremarkable. Previous LP unremarkable.   - Neurology consulted, appreciate recs.   - Increase Gabapentin to 600 mg TID.   - Ibuprofen ordered to be given with gabapentin doses.  - IV tylenol for breakthrough pain  - Pain management consulted. Appreciate recs. c/w Tizanidine 200 mg TID.   - c/w ibuprofen 600 mg TID.   - Follow-up pending laboratory studies.   - Folate level < 2.0. Will supplement.  - MRI C-spine and T-spine unremarkable.  - f/u us duplex b/l LE    - f/u Doppler u/s venous b/l LLE  - Changed pamelor to 10 mg in AM and 20 mg in PM   - Per neurology, contractions and leg pain are associated with EMG findings of demyelinating neuropathy, which can be explained by the folate deficiency.   - Considering pain management re-consult - Previous CT brain unremarkable. Previous LP unremarkable.   - Neurology consulted, appreciate recs.   - Increase Gabapentin to 600 mg TID.   - Ibuprofen ordered to be given with gabapentin doses.  - IV tylenol for breakthrough pain  - Pain management consulted. Appreciate recs. c/w Tizanidine 200 mg TID.   - c/w ibuprofen 600 mg TID.   - Follow-up pending laboratory studies.   - Folate level < 2.0. Will supplement.   - MRI C-spine and T-spine unremarkable.  - U/S duplex b/l LE unremarkable   - Changed pamelor to 10 mg qd   - Per neurology, contractions and leg pain are associated with EMG findings of demyelinating neuropathy, which can be explained by the folate deficiency.   - Pain management consulted. Appreciate new recommendations. Changed medications, including starting Tramadol. PRN. Started Senna PRN for bowel regimen.   - Podiatry consulted for possible pain management options, appreciate recs.   - Vascular surgery consulted, appreciate recs.

## 2022-07-05 NOTE — PROGRESS NOTE ADULT - ASSESSMENT
21F PMHx DM and HLD, no longer on medication.    Admitted with B/L peripheral foot neuropathy - pain, burning, numbness, pins and needles  Possibly 2/2 folate deficiency    Out- patient pain regimen: Neurontin 300 mg TID  Out Patient Pain Management provider: none    Pt reports inability to sleep at night 2/2 pain. Still with neuropathic pain. Reports improvement in cramping/spasms of toes. No longer feeling effect from Pamelor, despite increase in dose. Some effect from Gabapentinoids.   Have been trying to avoid opioids, as they're not indicated for neuropathic pain, but given her refractory pain discussed trialing Tramadol as it is a mild opioid similar in structure to antidepressant and therefor beneficial for neuropathic pain. There is risk of interaction with antidepressants (Serotonin Syndrome) so will taper Pamelor to off. Monitor EKG for QTc prolongation.    Plan discussed with primary team:  Discontinue Gabapentin  Change Lyrica to 100 mg every 8 hours (CrCl = 203)  Decrease Pamelor to 10 mg QD (plan to continue to taper to off)  Start Tramadol 25 mg q6h PRN, can increase to 50 mg if necessary  EKG ordered to assess QTc interval which was 474 on 6/28  Emla cream trial to B/L feet  Continue Tizanidine 2 mg every 8 hours PRN spasm  Discontinue Lidocaine TD patch, too sensitive to use  Continue Ibuprofen PRN  Bowel Regimen  Incentive Spirometer  PT per primary team      Time spent on encounter:    30    Minutes    [facility]  [phone]

## 2022-07-05 NOTE — PROVIDER CONTACT NOTE (OTHER) - ACTION/TREATMENT ORDERED:
pending orders from MD
EKG done today at 1700, MD aware, no further interventions ordered
MD to order IV tylenol
IV tylenol ordered

## 2022-07-05 NOTE — PROVIDER CONTACT NOTE (OTHER) - BACKGROUND
admitted with b/l foot pain
patient admitted for b/l lower extremity pain
pt admitted for neuralgia and neuritis
pt admitted for b/l leg pain [id number]

## 2022-07-05 NOTE — PROVIDER CONTACT NOTE (OTHER) - REASON
Pt c/o b/l foot pain
pt complaining of 9/10 pain
pt pain not controlled after iv tylenol
pt tachycardic to 122

## 2022-07-05 NOTE — PROVIDER CONTACT NOTE (OTHER) - SITUATION
patient c/o b/l foot pain, exacerbated by activity
pt complaining of 9/10 pain
pt pain not controlled after iv tylenol
pt tachycardic to 122

## 2022-07-06 LAB
ALBUMIN SERPL ELPH-MCNC: 3.9 G/DL — SIGNIFICANT CHANGE UP (ref 3.3–5)
ALP SERPL-CCNC: 54 U/L — SIGNIFICANT CHANGE UP (ref 40–120)
ALT FLD-CCNC: 62 U/L — HIGH (ref 10–45)
ANION GAP SERPL CALC-SCNC: 15 MMOL/L — SIGNIFICANT CHANGE UP (ref 5–17)
AST SERPL-CCNC: 24 U/L — SIGNIFICANT CHANGE UP (ref 10–40)
BILIRUB SERPL-MCNC: 0.4 MG/DL — SIGNIFICANT CHANGE UP (ref 0.2–1.2)
BUN SERPL-MCNC: 13 MG/DL — SIGNIFICANT CHANGE UP (ref 7–23)
CALCIUM SERPL-MCNC: 9.4 MG/DL — SIGNIFICANT CHANGE UP (ref 8.4–10.5)
CHLORIDE SERPL-SCNC: 102 MMOL/L — SIGNIFICANT CHANGE UP (ref 96–108)
CO2 SERPL-SCNC: 21 MMOL/L — LOW (ref 22–31)
CREAT SERPL-MCNC: 0.48 MG/DL — LOW (ref 0.5–1.3)
EGFR: 137 ML/MIN/1.73M2 — SIGNIFICANT CHANGE UP
FOLATE SERPL-MCNC: >20 NG/ML — SIGNIFICANT CHANGE UP
GLUCOSE SERPL-MCNC: 73 MG/DL — SIGNIFICANT CHANGE UP (ref 70–99)
HCT VFR BLD CALC: 38.5 % — SIGNIFICANT CHANGE UP (ref 34.5–45)
HGB BLD-MCNC: 12.4 G/DL — SIGNIFICANT CHANGE UP (ref 11.5–15.5)
MAGNESIUM SERPL-MCNC: 2 MG/DL — SIGNIFICANT CHANGE UP (ref 1.6–2.6)
MCHC RBC-ENTMCNC: 27.3 PG — SIGNIFICANT CHANGE UP (ref 27–34)
MCHC RBC-ENTMCNC: 32.2 GM/DL — SIGNIFICANT CHANGE UP (ref 32–36)
MCV RBC AUTO: 84.6 FL — SIGNIFICANT CHANGE UP (ref 80–100)
NRBC # BLD: 0 /100 WBCS — SIGNIFICANT CHANGE UP (ref 0–0)
PHOSPHATE SERPL-MCNC: 4.8 MG/DL — HIGH (ref 2.5–4.5)
PLATELET # BLD AUTO: 313 K/UL — SIGNIFICANT CHANGE UP (ref 150–400)
POTASSIUM SERPL-MCNC: 4.1 MMOL/L — SIGNIFICANT CHANGE UP (ref 3.5–5.3)
POTASSIUM SERPL-SCNC: 4.1 MMOL/L — SIGNIFICANT CHANGE UP (ref 3.5–5.3)
PROT SERPL-MCNC: 6.9 G/DL — SIGNIFICANT CHANGE UP (ref 6–8.3)
RBC # BLD: 4.55 M/UL — SIGNIFICANT CHANGE UP (ref 3.8–5.2)
RBC # FLD: 17.1 % — HIGH (ref 10.3–14.5)
SODIUM SERPL-SCNC: 138 MMOL/L — SIGNIFICANT CHANGE UP (ref 135–145)
WBC # BLD: 7.68 K/UL — SIGNIFICANT CHANGE UP (ref 3.8–10.5)
WBC # FLD AUTO: 7.68 K/UL — SIGNIFICANT CHANGE UP (ref 3.8–10.5)

## 2022-07-06 PROCEDURE — 99231 SBSQ HOSP IP/OBS SF/LOW 25: CPT

## 2022-07-06 RX ORDER — NORTRIPTYLINE HYDROCHLORIDE 10 MG/1
10 CAPSULE ORAL
Refills: 0 | Status: DISCONTINUED | OUTPATIENT
Start: 2022-07-06 | End: 2022-07-08

## 2022-07-06 RX ADMIN — Medication 600 MILLIGRAM(S): at 05:17

## 2022-07-06 RX ADMIN — TRAMADOL HYDROCHLORIDE 25 MILLIGRAM(S): 50 TABLET ORAL at 07:40

## 2022-07-06 RX ADMIN — TRAMADOL HYDROCHLORIDE 25 MILLIGRAM(S): 50 TABLET ORAL at 06:57

## 2022-07-06 RX ADMIN — NORTRIPTYLINE HYDROCHLORIDE 10 MILLIGRAM(S): 10 CAPSULE ORAL at 12:54

## 2022-07-06 RX ADMIN — Medication 600 MILLIGRAM(S): at 14:03

## 2022-07-06 RX ADMIN — PREGABALIN 1000 MICROGRAM(S): 225 CAPSULE ORAL at 12:54

## 2022-07-06 RX ADMIN — Medication 1 TABLET(S): at 12:54

## 2022-07-06 RX ADMIN — Medication 100 MILLIGRAM(S): at 12:53

## 2022-07-06 RX ADMIN — ENOXAPARIN SODIUM 40 MILLIGRAM(S): 100 INJECTION SUBCUTANEOUS at 18:56

## 2022-07-06 RX ADMIN — Medication 100 MILLIGRAM(S): at 21:30

## 2022-07-06 RX ADMIN — Medication 5 MILLIGRAM(S): at 12:54

## 2022-07-06 RX ADMIN — Medication 100 MILLIGRAM(S): at 14:03

## 2022-07-06 RX ADMIN — Medication 600 MILLIGRAM(S): at 21:30

## 2022-07-06 RX ADMIN — Medication 100 MILLIGRAM(S): at 05:17

## 2022-07-06 RX ADMIN — NORTRIPTYLINE HYDROCHLORIDE 10 MILLIGRAM(S): 10 CAPSULE ORAL at 21:30

## 2022-07-06 NOTE — CHART NOTE - NSCHARTNOTEFT_GEN_A_CORE
Called to bedside for tachycardia. Patient agitated, expressing desire and frustration, asking to sit up. Denied chest pain, palpitations, shortness of breath. Provided reassurance and assistance in sitting up. Heart rate 90s. Two hours later, patient asleep, resting comfortably. Called to bedside for tachycardia. Patient agitated, expressing frustration and desire to sit up. Denied chest pain, palpitations, shortness of breath. Provided reassurance and assistance in sitting up. Heart rate 90s. Two hours later, patient asleep, resting comfortably.

## 2022-07-06 NOTE — PROGRESS NOTE ADULT - SUBJECTIVE AND OBJECTIVE BOX
PROGRESS NOTE:     Patient is a 21y old  Female who presents with a chief complaint of Neuropathy (05 Jul 2022 15:35)      SUBJECTIVE / OVERNIGHT EVENTS:    No acute events overnight. Patient examined at bedside with no acute complaints.     Pain:  Bowel Movements:  Urination:  OOB:  PT:    REVIEW OF SYSTEMS:    CONSTITUTIONAL: No weakness, fevers or chills  EYES/ENT: No visual changes;  No vertigo or throat pain   NECK: No pain or stiffness  RESPIRATORY: No cough, wheezing, hemoptysis; No shortness of breath  CARDIOVASCULAR: No chest pain or palpitations  GASTROINTESTINAL: No abdominal or epigastric pain. No nausea, vomiting, or hematemesis; No diarrhea or constipation. No melena or hematochezia.  GENITOURINARY: No dysuria, frequency or hematuria  NEUROLOGICAL: No numbness or weakness  SKIN: No itching, rashes      MEDICATIONS  (STANDING):  cyanocobalamin 1000 MICROGram(s) Oral daily  enoxaparin Injectable 40 milliGRAM(s) SubCutaneous every 24 hours  folic acid 5 milliGRAM(s) Oral daily  ibuprofen  Tablet. 600 milliGRAM(s) Oral three times a day  influenza   Vaccine 0.5 milliLiter(s) IntraMuscular once  lidocaine/prilocaine Cream 1 Application(s) Topical daily  multivitamin/minerals 1 Tablet(s) Oral daily  nortriptyline 10 milliGRAM(s) Oral daily  pregabalin 100 milliGRAM(s) Oral every 8 hours  pyridoxine 100 milliGRAM(s) Oral daily  thiamine 100 milliGRAM(s) Oral daily    MEDICATIONS  (PRN):  acetaminophen     Tablet .. 650 milliGRAM(s) Oral every 6 hours PRN Temp greater or equal to 38C (100.4F), Mild Pain (1 - 3)  melatonin 3 milliGRAM(s) Oral at bedtime PRN Insomnia  senna 1 Tablet(s) Oral daily PRN Constipation  tiZANidine 2 milliGRAM(s) Oral every 8 hours PRN Muscle Spasm  traMADol 25 milliGRAM(s) Oral every 6 hours PRN Severe Pain (7 - 10)      CAPILLARY BLOOD GLUCOSE        I&O's Summary    04 Jul 2022 07:01  -  05 Jul 2022 07:00  --------------------------------------------------------  IN: 480 mL / OUT: 0 mL / NET: 480 mL    05 Jul 2022 07:01  -  06 Jul 2022 06:56  --------------------------------------------------------  IN: 80 mL / OUT: 0 mL / NET: 80 mL        VITALS:   T(C): 36.8 (07-06-22 @ 04:38), Max: 36.9 (07-05-22 @ 20:10)  HR: 124 (07-06-22 @ 04:38) (98 - 124)  BP: 114/83 (07-06-22 @ 04:38) (114/83 - 128/90)  RR: 18 (07-06-22 @ 04:38) (18 - 18)  SpO2: 98% (07-06-22 @ 04:38) (98% - 99%)    GENERAL: NAD, lying in bed comfortably  HEAD:  Atraumatic, normocephalic  EYES: EOMI, PERRLA, conjunctiva and sclera clear  ENT: Moist mucous membranes  NECK: Supple, no JVD  HEART: Regular rate and rhythm, no murmurs, rubs, or gallops  LUNGS: Unlabored respirations.  Clear to auscultation bilaterally, no crackles, wheezing, or rhonchi  ABDOMEN: Soft, nontender, nondistended, +BS  EXTREMITIES: 2+ peripheral pulses bilaterally. No clubbing, cyanosis, or edema  NERVOUS SYSTEM:  A&Ox3, no focal deficits   SKIN: No rashes or lesions    LABS:                        12.2   7.52  )-----------( 274      ( 05 Jul 2022 07:42 )             38.0     07-05    141  |  104  |  13  ----------------------------<  82  3.7   |  23  |  0.51    Ca    9.2      05 Jul 2022 07:42  Phos  4.3     07-05  Mg     2.1     07-05    TPro  6.7  /  Alb  3.8  /  TBili  0.4  /  DBili  x   /  AST  23  /  ALT  52<H>  /  AlkPhos  52  07-05                RADIOLOGY & ADDITIONAL TESTS:  Results Reviewed:   Imaging Personally Reviewed:  Electrocardiogram Personally Reviewed:    COORDINATION OF CARE:  Care Discussed with Consultants/Other Providers [Y/N]:  Prior or Outpatient Records Reviewed [Y/N]:   PROGRESS NOTE:     Patient is a 21y old  Female who presents with a chief complaint of Neuropathy (05 Jul 2022 15:35)      SUBJECTIVE / OVERNIGHT EVENTS:    Overnight, team was called due to tachycardia and found patient upset over not being assisted in sitting up, evaluated to be asymptomatic. Patient examined at bedside. Patient reports pain worsened several hours after receiving medications, at 9/10 severity. Now currently at 4/10 pain. Patient denies recollection of overnight event.     Pain: 4/10   Bowel Movements: regular  Urination: regular  OOB: as tolerated   PT: following     REVIEW OF SYSTEMS:    CONSTITUTIONAL: In pain   EYES/ENT: No visual changes;  No vertigo or throat pain   NECK: No pain or stiffness  RESPIRATORY: No cough, wheezing, hemoptysis; No shortness of breath  CARDIOVASCULAR: No chest pain or palpitations  GASTROINTESTINAL: No abdominal or epigastric pain. No nausea, vomiting, or hematemesis; No diarrhea or constipation. No melena or hematochezia.  GENITOURINARY: No dysuria, frequency or hematuria  NEUROLOGICAL: + numbness, weakness, and pain   SKIN: No itching, rashes      MEDICATIONS  (STANDING):  cyanocobalamin 1000 MICROGram(s) Oral daily  enoxaparin Injectable 40 milliGRAM(s) SubCutaneous every 24 hours  folic acid 5 milliGRAM(s) Oral daily  ibuprofen  Tablet. 600 milliGRAM(s) Oral three times a day  influenza   Vaccine 0.5 milliLiter(s) IntraMuscular once  lidocaine/prilocaine Cream 1 Application(s) Topical daily  multivitamin/minerals 1 Tablet(s) Oral daily  nortriptyline 10 milliGRAM(s) Oral daily  pregabalin 100 milliGRAM(s) Oral every 8 hours  pyridoxine 100 milliGRAM(s) Oral daily  thiamine 100 milliGRAM(s) Oral daily    MEDICATIONS  (PRN):  acetaminophen     Tablet .. 650 milliGRAM(s) Oral every 6 hours PRN Temp greater or equal to 38C (100.4F), Mild Pain (1 - 3)  melatonin 3 milliGRAM(s) Oral at bedtime PRN Insomnia  senna 1 Tablet(s) Oral daily PRN Constipation  tiZANidine 2 milliGRAM(s) Oral every 8 hours PRN Muscle Spasm  traMADol 25 milliGRAM(s) Oral every 6 hours PRN Severe Pain (7 - 10)      CAPILLARY BLOOD GLUCOSE        I&O's Summary    04 Jul 2022 07:01  -  05 Jul 2022 07:00  --------------------------------------------------------  IN: 480 mL / OUT: 0 mL / NET: 480 mL    05 Jul 2022 07:01  -  06 Jul 2022 06:56  --------------------------------------------------------  IN: 80 mL / OUT: 0 mL / NET: 80 mL        VITALS:   T(C): 36.8 (07-06-22 @ 04:38), Max: 36.9 (07-05-22 @ 20:10)  HR: 124 (07-06-22 @ 04:38) (98 - 124)  BP: 114/83 (07-06-22 @ 04:38) (114/83 - 128/90)  RR: 18 (07-06-22 @ 04:38) (18 - 18)  SpO2: 98% (07-06-22 @ 04:38) (98% - 99%)    GENERAL: In pain, lying in bed   HEAD:  Atraumatic, normocephalic  EYES: EOMI, PERRLA, conjunctiva and sclera clear  ENT: Moist mucous membranes  NECK: Supple, no JVD  HEART: Regular rate and rhythm, no murmurs, rubs, or gallops  LUNGS: Unlabored respirations.  Clear to auscultation bilaterally, no crackles, wheezing, or rhonchi  ABDOMEN: Soft, nontender, nondistended, +BS  EXTREMITIES: 2+ peripheral pulses bilaterally. No clubbing, cyanosis, or edema  NERVOUS SYSTEM:  A&Ox3, + numbness, weakness, and pain   SKIN: No rashes or lesions    LABS:                        12.2   7.52  )-----------( 274      ( 05 Jul 2022 07:42 )             38.0     07-05    141  |  104  |  13  ----------------------------<  82  3.7   |  23  |  0.51    Ca    9.2      05 Jul 2022 07:42  Phos  4.3     07-05  Mg     2.1     07-05    TPro  6.7  /  Alb  3.8  /  TBili  0.4  /  DBili  x   /  AST  23  /  ALT  52<H>  /  AlkPhos  52  07-05                RADIOLOGY & ADDITIONAL TESTS:  Results Reviewed:     Comprehensive Metabolic Panel in AM (07.06.22 @ 07:44)   Sodium, Serum: 138 mmol/L   Potassium, Serum: 4.1 mmol/L   Chloride, Serum: 102 mmol/L   Carbon Dioxide, Serum: 21 mmol/L   Anion Gap, Serum: 15 mmol/L   Blood Urea Nitrogen, Serum: 13 mg/dL   Creatinine, Serum: 0.48 mg/dL   Glucose, Serum: 73 mg/dL   Calcium, Total Serum: 9.4 mg/dL   Protein Total, Serum: 6.9 g/dL   Albumin, Serum: 3.9 g/dL   Bilirubin Total, Serum: 0.4 mg/dL   Alkaline Phosphatase, Serum: 54 U/L   Aspartate Aminotransferase (AST/SGOT): 24 U/L   Alanine Aminotransferase (ALT/SGPT): 62 U/L   eGFR: 137    Imaging Personally Reviewed:  Electrocardiogram Personally Reviewed:    COORDINATION OF CARE:  Care Discussed with Consultants/Other Providers [Y/N]:  Prior or Outpatient Records Reviewed [Y/N]:   PROGRESS NOTE:     Patient is a 21y old  Female who presents with a chief complaint of Neuropathy (05 Jul 2022 15:35)      SUBJECTIVE / OVERNIGHT EVENTS:    Overnight, team was called due to tachycardia and found patient upset over not being assisted in sitting up, evaluated to be asymptomatic. Patient examined at bedside. Patient reports pain worsened several hours after receiving medications, at 9/10 severity. Now currently at 4/10 pain. Patient denies recollection of overnight event.     Pain: 4/10   Bowel Movements: regular  Urination: regular  OOB: as tolerated   PT: following     REVIEW OF SYSTEMS:    CONSTITUTIONAL: In pain   EYES/ENT: No visual changes;  No vertigo or throat pain   NECK: No pain or stiffness  RESPIRATORY: No cough, wheezing, hemoptysis; No shortness of breath  CARDIOVASCULAR: No chest pain or palpitations  GASTROINTESTINAL: No abdominal or epigastric pain. No nausea, vomiting, or hematemesis; No diarrhea or constipation. No melena or hematochezia.  GENITOURINARY: No dysuria, frequency or hematuria  NEUROLOGICAL: + numbness, weakness, and pain   SKIN: No itching, rashes      MEDICATIONS  (STANDING):  cyanocobalamin 1000 MICROGram(s) Oral daily  enoxaparin Injectable 40 milliGRAM(s) SubCutaneous every 24 hours  folic acid 5 milliGRAM(s) Oral daily  ibuprofen  Tablet. 600 milliGRAM(s) Oral three times a day  influenza   Vaccine 0.5 milliLiter(s) IntraMuscular once  lidocaine/prilocaine Cream 1 Application(s) Topical daily  multivitamin/minerals 1 Tablet(s) Oral daily  nortriptyline 10 milliGRAM(s) Oral daily  pregabalin 100 milliGRAM(s) Oral every 8 hours  pyridoxine 100 milliGRAM(s) Oral daily  thiamine 100 milliGRAM(s) Oral daily    MEDICATIONS  (PRN):  acetaminophen     Tablet .. 650 milliGRAM(s) Oral every 6 hours PRN Temp greater or equal to 38C (100.4F), Mild Pain (1 - 3)  melatonin 3 milliGRAM(s) Oral at bedtime PRN Insomnia  senna 1 Tablet(s) Oral daily PRN Constipation  tiZANidine 2 milliGRAM(s) Oral every 8 hours PRN Muscle Spasm  traMADol 25 milliGRAM(s) Oral every 6 hours PRN Severe Pain (7 - 10)      CAPILLARY BLOOD GLUCOSE        I&O's Summary    04 Jul 2022 07:01  -  05 Jul 2022 07:00  --------------------------------------------------------  IN: 480 mL / OUT: 0 mL / NET: 480 mL    05 Jul 2022 07:01  -  06 Jul 2022 06:56  --------------------------------------------------------  IN: 80 mL / OUT: 0 mL / NET: 80 mL        VITALS:   T(C): 36.8 (07-06-22 @ 04:38), Max: 36.9 (07-05-22 @ 20:10)  HR: 124 (07-06-22 @ 04:38) (98 - 124)  BP: 114/83 (07-06-22 @ 04:38) (114/83 - 128/90)  RR: 18 (07-06-22 @ 04:38) (18 - 18)  SpO2: 98% (07-06-22 @ 04:38) (98% - 99%)    GENERAL: In pain, lying in bed   HEAD:  Atraumatic, normocephalic  EYES: EOMI, PERRLA, conjunctiva and sclera clear  ENT: Moist mucous membranes  NECK: Supple, no JVD  HEART: Regular rate and rhythm, no murmurs, rubs, or gallops  LUNGS: Unlabored respirations.  Clear to auscultation bilaterally, no crackles, wheezing, or rhonchi  ABDOMEN: Soft, nontender, nondistended, +BS  EXTREMITIES: 2+ peripheral pulses bilaterally. No clubbing, cyanosis, or edema  NERVOUS SYSTEM:  A&Ox3, + numbness, weakness, and pain   SKIN: No rashes or lesions    LABS:                        12.2   7.52  )-----------( 274      ( 05 Jul 2022 07:42 )             38.0     07-05    141  |  104  |  13  ----------------------------<  82  3.7   |  23  |  0.51    Ca    9.2      05 Jul 2022 07:42  Phos  4.3     07-05  Mg     2.1     07-05    TPro  6.7  /  Alb  3.8  /  TBili  0.4  /  DBili  x   /  AST  23  /  ALT  52<H>  /  AlkPhos  52  07-05                RADIOLOGY & ADDITIONAL TESTS:  Results Reviewed:     Comprehensive Metabolic Panel in AM (07.06.22 @ 07:44)   Sodium, Serum: 138 mmol/L   Potassium, Serum: 4.1 mmol/L   Chloride, Serum: 102 mmol/L   Carbon Dioxide, Serum: 21 mmol/L   Anion Gap, Serum: 15 mmol/L   Blood Urea Nitrogen, Serum: 13 mg/dL   Creatinine, Serum: 0.48 mg/dL   Glucose, Serum: 73 mg/dL   Calcium, Total Serum: 9.4 mg/dL   Protein Total, Serum: 6.9 g/dL   Albumin, Serum: 3.9 g/dL   Bilirubin Total, Serum: 0.4 mg/dL   Alkaline Phosphatase, Serum: 54 U/L   Aspartate Aminotransferase (AST/SGOT): 24 U/L   Alanine Aminotransferase (ALT/SGPT): 62 U/L   eGFR: 137    Imaging Personally Reviewed:  Electrocardiogram Personally Reviewed:    COORDINATION OF CARE:  Care Discussed with Consultants/Other Providers [Y/N]: Y - pain management

## 2022-07-06 NOTE — PROGRESS NOTE ADULT - ASSESSMENT
21F PMHx DM and HLD, no longer on medication.    Admitted with B/L peripheral foot neuropathy - pain, burning, numbness, pins and needles  Possibly 2/2 folate deficiency    Out- patient pain regimen: Neurontin 300 mg TID  Out Patient Pain Management provider: none    Pt Reports much better effect with current regimen and slept for at least 4 hours overnight.     Plan discussed with primary team:  Decrease Pamelor 10 mg to every other day x 1 week then discontinue (has been on it for 10 days)  Continue Tramadol 25 mg q6h PRN severe pain (EKG on 7/5 IMn=650)  Monitor QTc while on Tramadol  Continue Lyrica to 100 mg every 8 hours (CrCl = 215.8)  Decrease Pamelor to 10 mg QD (plan to continue to taper to off)  Emla cream trial to B/L feet  Continue Tizanidine 2 mg every 8 hours PRN spasm  Continue Ibuprofen PRN  Bowel Regimen  Incentive Spirometer  PT per primary team      Time spent on encounter:    20    Minutes    Chronic Pain Service  [phone] 21F PMHx DM and HLD, no longer on medication.    Admitted with B/L peripheral foot neuropathy - pain, burning, numbness, pins and needles  Possibly 2/2 folate deficiency    Out- patient pain regimen: Neurontin 300 mg TID  Out Patient Pain Management provider: none    Pt Reports much better effect with current regimen and slept for at least 4 hours overnight.     Plan discussed with primary team:  Decrease Pamelor 10 mg to every other day x 1 week then discontinue (has been on it for 10 days)  Continue Tramadol 25 mg q6h PRN severe pain (EKG on 7/5 XWa=800)  Monitor QTc while on Tramadol  Continue Lyrica to 100 mg every 8 hours (CrCl = 215.8)  Emla cream trial to B/L feet  Continue Tizanidine 2 mg every 8 hours PRN spasm  Continue Ibuprofen PRN  Bowel Regimen  Incentive Spirometer  PT per primary team      Time spent on encounter:    20    Minutes    Chronic Pain Service  [phone]

## 2022-07-06 NOTE — PROGRESS NOTE ADULT - ASSESSMENT
21F with b/l foot pain  - pt seen and evaluated  - afebrile, no leukocytosis  - no open lesions noted on foot  - pain to b/l feet 2/2 neuropathy likley due to folate deficiency  - recommend care per neurology  - no intervention indicated per podiatry  - reconsult as needed  - seen with attending

## 2022-07-06 NOTE — PROGRESS NOTE ADULT - SUBJECTIVE AND OBJECTIVE BOX
Patient is a 21y old  Female who presents with a chief complaint of Neuropathy (06 Jul 2022 06:56)       INTERVAL HPI/OVERNIGHT EVENTS:  Patient seen and evaluated at bedside.  Pt is resting comfortable in NAD. Denies N/V/F/C.    Allergies    No Known Allergies    Intolerances        Vital Signs Last 24 Hrs  T(C): 36.8 (06 Jul 2022 04:38), Max: 36.9 (05 Jul 2022 20:10)  T(F): 98.3 (06 Jul 2022 04:38), Max: 98.4 (05 Jul 2022 20:10)  HR: 124 (06 Jul 2022 04:38) (98 - 124)  BP: 114/83 (06 Jul 2022 04:38) (114/83 - 128/90)  BP(mean): --  RR: 18 (06 Jul 2022 04:38) (18 - 18)  SpO2: 98% (06 Jul 2022 04:38) (98% - 99%)    LABS:                        12.4   7.68  )-----------( 313      ( 06 Jul 2022 07:47 )             38.5     07-06    138  |  102  |  13  ----------------------------<  73  4.1   |  21<L>  |  0.48<L>    Ca    9.4      06 Jul 2022 07:44  Phos  4.8     07-06  Mg     2.0     07-06    TPro  6.9  /  Alb  3.9  /  TBili  0.4  /  DBili  x   /  AST  24  /  ALT  62<H>  /  AlkPhos  54  07-06        CAPILLARY BLOOD GLUCOSE          Lower Extremity Physical Exam:  Vascular: DP/PT 2/4 B/L, CFT <3 sec x 10, Temp gradient warm to warm B/L  Neurology: Epicritic sensation diminsihed to level of digits, B/L  Musculoskeletal/Ortho: unremarkable  Skin: no open wounds or lesions noted    RADIOLOGY & ADDITIONAL TESTS:

## 2022-07-06 NOTE — PROGRESS NOTE ADULT - SUBJECTIVE AND OBJECTIVE BOX
CHIEF COMPLAINT:  Patient is a 21y old  Female who presents with a chief complaint of Neuropathy (01 Jul 2022 06:44)    Interval HPI:   21F PMHx DM and HLD, no longer on medication.    Admitted with B/L peripheral foot neuropathy - pain, burning, numbness, pins and needles  Possibly 2/2 folate deficiency    Out- patient pain regimen: Neurontin 300 mg TID  Out Patient Pain Management provider: none    Pt seen sitting up in chair, visitors at bedside. Reports much better effect with current regimen and slept for at least 4 hours overnight.     PHYSICAL EXAM  GENERAL: seen at bedside; NAD; well developed, well groomed; no signs of toxicity  HEENT: head atraumatic, normocephalic; anicteric; speech clear and fluent  NEURO: A + O X 3; good concentration; + allodynia and hyperesthesias of B/L feet   GI: (+)BM  :  voiding  EXTREMITIES: moving all extremities; independent ambulator however using  or walker for safety, ambulated down [place] today  SKIN: no rashes, lesions    PSYCH: affect normal; good eye contact; no signs of depression today      T(C): 36.8 (07-06-22 @ 13:44)  HR: 105 (07-06-22 @ 13:44)  BP: 141/95 (07-06-22 @ 13:44)  RR: 18 (07-06-22 @ 13:44)  SpO2: 99% (07-06-22 @ 13:44)      MEDICATIONS  (STANDING):  cyanocobalamin 1000 MICROGram(s) Oral daily  enoxaparin Injectable 40 milliGRAM(s) SubCutaneous every 24 hours  folic acid 5 milliGRAM(s) Oral daily  ibuprofen  Tablet. 600 milliGRAM(s) Oral three times a day  influenza   Vaccine 0.5 milliLiter(s) IntraMuscular once  lidocaine/prilocaine Cream 1 Application(s) Topical daily  multivitamin/minerals 1 Tablet(s) Oral daily  nortriptyline 10 milliGRAM(s) Oral daily  pregabalin 100 milliGRAM(s) Oral every 8 hours  pyridoxine 100 milliGRAM(s) Oral daily  thiamine 100 milliGRAM(s) Oral daily    MEDICATIONS  (PRN):  acetaminophen     Tablet .. 650 milliGRAM(s) Oral every 6 hours PRN Temp greater or equal to 38C (100.4F), Mild Pain (1 - 3)  melatonin 3 milliGRAM(s) Oral at bedtime PRN Insomnia  senna 1 Tablet(s) Oral daily PRN Constipation  tiZANidine 2 milliGRAM(s) Oral every 8 hours PRN Muscle Spasm  traMADol 25 milliGRAM(s) Oral every 6 hours PRN Severe Pain (7 - 10)      Allergies    No Known Allergies        Pertinent labs, radiology, additional studies:  Reviewed                          12.4   7.68  )-----------( 313      ( 06 Jul 2022 07:47 )             38.5       07-06    138  |  102  |  13  ----------------------------<  73  4.1   |  21<L>  |  0.48<L>    Ca    9.4      06 Jul 2022 07:44  Phos  4.8     07-06  Mg     2.0     07-06    TPro  6.9  /  Alb  3.9  /  TBili  0.4  /  DBili  x   /  AST  24  /  ALT  62<H>  /  AlkPhos  54  07-06

## 2022-07-06 NOTE — PROGRESS NOTE ADULT - PROBLEM SELECTOR PLAN 1
- Previous CT brain unremarkable. Previous LP unremarkable.   - Neurology consulted, appreciate recs.   - Increase Gabapentin to 600 mg TID.   - Ibuprofen ordered to be given with gabapentin doses.  - IV tylenol for breakthrough pain  - Pain management consulted. Appreciate recs. c/w Tizanidine 200 mg TID.   - c/w ibuprofen 600 mg TID.   - Follow-up pending laboratory studies.   - Folate level < 2.0. Will supplement.   - MRI C-spine and T-spine unremarkable.  - U/S duplex b/l LE unremarkable   - Changed pamelor to 10 mg qd   - Per neurology, contractions and leg pain are associated with EMG findings of demyelinating neuropathy, which can be explained by the folate deficiency.   - Pain management consulted. Appreciate new recommendations. Changed medications, including starting Tramadol. PRN. Started Senna PRN for bowel regimen.   - Podiatry consulted for possible pain management options, appreciate recs.   - Vascular surgery consulted, appreciate recs. - Previous CT brain unremarkable. Previous LP unremarkable.   - Neurology consulted, appreciate recs.   - Increase Gabapentin to 600 mg TID.   - Ibuprofen ordered to be given with gabapentin doses.  - IV tylenol for breakthrough pain  - Pain management consulted. Appreciate recs. c/w Tizanidine 200 mg TID.   - c/w ibuprofen 600 mg TID.   - Follow-up pending laboratory studies.   - Folate level < 2.0. Will supplement.   - MRI C-spine and T-spine unremarkable.  - U/S duplex b/l LE unremarkable   - Changed pamelor to 10 mg qd   - Per neurology, contractions and leg pain are associated with EMG findings of demyelinating neuropathy, which can be explained by the folate deficiency.   - Pain management consulted. Appreciate new recommendations. Will follow-up regarding pain management.   - Podiatry consulted for possible pain management options, appreciate recs.   - Vascular surgery consulted, appreciate recs. - Previous CT brain unremarkable. Previous LP unremarkable.   - Neurology consulted, appreciate recs.   - Increase Gabapentin to 600 mg TID.   - Ibuprofen ordered to be given with gabapentin doses.  - IV tylenol for breakthrough pain  - Pain management consulted. Appreciate recs. c/w Tizanidine 200 mg TID.   - c/w ibuprofen 600 mg TID.   - Follow-up pending laboratory studies.   - Folate level < 2.0. Will supplement.   - MRI C-spine and T-spine unremarkable.  - U/S duplex b/l LE unremarkable   - Changed pamelor to 10 mg qd   - Per neurology, contractions and leg pain are associated with EMG findings of demyelinating neuropathy, which can be explained by the folate deficiency.   - Pain management consulted. Appreciate new recommendations. Will follow-up regarding pain management.   - Podiatry consulted for possible pain management options, appreciate recs.   - Vascular surgery consulted, appreciate recs.  - Podiatry consulted, appreciate recs. Per podiatry, no indication for intervention at this time. - Previous CT brain unremarkable. Previous LP unremarkable.   - Neurology consulted, appreciate recs.   - Increase Gabapentin to 600 mg TID.   - Ibuprofen ordered to be given with gabapentin doses.  - IV tylenol for breakthrough pain  - c/w ibuprofen 600 mg TID.   - Follow-up pending laboratory studies.   - Folate level < 2.0. Will supplement.   - MRI C-spine and T-spine unremarkable.  - U/S duplex b/l LE unremarkable   - Changed pamelor to 10 mg qd   - Per neurology, contractions and leg pain are associated with EMG findings of demyelinating neuropathy, which can be explained by the folate deficiency.   - Pain management consulted. Appreciate new recommendations. Will follow-up regarding pain management.   - Podiatry consulted for possible pain management options, appreciate recs.   - Vascular surgery consulted, appreciate recs.  - Podiatry consulted, appreciate recs. Per podiatry, no indication for intervention at this time. - Previous CT brain unremarkable. Previous LP unremarkable.   - Neurology consulted, appreciate recs.   - Increase Gabapentin to 600 mg TID.   - Ibuprofen ordered to be given with gabapentin doses.  - IV tylenol for breakthrough pain  - c/w ibuprofen 600 mg TID.   - Follow-up pending laboratory studies.   - Folate level < 2.0. Will supplement.   - MRI C-spine and T-spine unremarkable.  - U/S duplex b/l LE unremarkable   - Changed pamelor to 10 mg qd   - Per neurology, contractions and leg pain are associated with EMG findings of demyelinating neuropathy, which can be explained by the folate deficiency.   - Pain management consulted. Appreciate new recommendations. Will follow-up regarding pain management. Per pain management, decreased Pamelor to once every other day for 1 week, then dc.   - Podiatry consulted for possible pain management options, appreciate recs.   - Vascular surgery consulted, appreciate recs.  - Podiatry consulted, appreciate recs. Per podiatry, no indication for intervention at this time.

## 2022-07-07 LAB
ALBUMIN SERPL ELPH-MCNC: 3.6 G/DL — SIGNIFICANT CHANGE UP (ref 3.3–5)
ALP SERPL-CCNC: 51 U/L — SIGNIFICANT CHANGE UP (ref 40–120)
ALT FLD-CCNC: 63 U/L — HIGH (ref 10–45)
ANION GAP SERPL CALC-SCNC: 12 MMOL/L — SIGNIFICANT CHANGE UP (ref 5–17)
AST SERPL-CCNC: 24 U/L — SIGNIFICANT CHANGE UP (ref 10–40)
BILIRUB SERPL-MCNC: 0.3 MG/DL — SIGNIFICANT CHANGE UP (ref 0.2–1.2)
BUN SERPL-MCNC: 12 MG/DL — SIGNIFICANT CHANGE UP (ref 7–23)
CALCIUM SERPL-MCNC: 8.9 MG/DL — SIGNIFICANT CHANGE UP (ref 8.4–10.5)
CHLORIDE SERPL-SCNC: 103 MMOL/L — SIGNIFICANT CHANGE UP (ref 96–108)
CO2 SERPL-SCNC: 21 MMOL/L — LOW (ref 22–31)
CREAT SERPL-MCNC: 0.53 MG/DL — SIGNIFICANT CHANGE UP (ref 0.5–1.3)
EGFR: 134 ML/MIN/1.73M2 — SIGNIFICANT CHANGE UP
GLUCOSE SERPL-MCNC: 81 MG/DL — SIGNIFICANT CHANGE UP (ref 70–99)
HCT VFR BLD CALC: 35.8 % — SIGNIFICANT CHANGE UP (ref 34.5–45)
HGB BLD-MCNC: 11.9 G/DL — SIGNIFICANT CHANGE UP (ref 11.5–15.5)
MAGNESIUM SERPL-MCNC: 2 MG/DL — SIGNIFICANT CHANGE UP (ref 1.6–2.6)
MCHC RBC-ENTMCNC: 27.7 PG — SIGNIFICANT CHANGE UP (ref 27–34)
MCHC RBC-ENTMCNC: 33.2 GM/DL — SIGNIFICANT CHANGE UP (ref 32–36)
MCV RBC AUTO: 83.4 FL — SIGNIFICANT CHANGE UP (ref 80–100)
NRBC # BLD: 0 /100 WBCS — SIGNIFICANT CHANGE UP (ref 0–0)
PHOSPHATE SERPL-MCNC: 4.8 MG/DL — HIGH (ref 2.5–4.5)
PLATELET # BLD AUTO: 270 K/UL — SIGNIFICANT CHANGE UP (ref 150–400)
POTASSIUM SERPL-MCNC: 3.5 MMOL/L — SIGNIFICANT CHANGE UP (ref 3.5–5.3)
POTASSIUM SERPL-SCNC: 3.5 MMOL/L — SIGNIFICANT CHANGE UP (ref 3.5–5.3)
PROT SERPL-MCNC: 6.5 G/DL — SIGNIFICANT CHANGE UP (ref 6–8.3)
RBC # BLD: 4.29 M/UL — SIGNIFICANT CHANGE UP (ref 3.8–5.2)
RBC # FLD: 16.9 % — HIGH (ref 10.3–14.5)
SODIUM SERPL-SCNC: 136 MMOL/L — SIGNIFICANT CHANGE UP (ref 135–145)
WBC # BLD: 7.89 K/UL — SIGNIFICANT CHANGE UP (ref 3.8–10.5)
WBC # FLD AUTO: 7.89 K/UL — SIGNIFICANT CHANGE UP (ref 3.8–10.5)

## 2022-07-07 RX ADMIN — Medication 600 MILLIGRAM(S): at 05:24

## 2022-07-07 RX ADMIN — Medication 1 TABLET(S): at 12:48

## 2022-07-07 RX ADMIN — TRAMADOL HYDROCHLORIDE 25 MILLIGRAM(S): 50 TABLET ORAL at 02:20

## 2022-07-07 RX ADMIN — PREGABALIN 1000 MICROGRAM(S): 225 CAPSULE ORAL at 12:48

## 2022-07-07 RX ADMIN — Medication 5 MILLIGRAM(S): at 12:53

## 2022-07-07 RX ADMIN — Medication 100 MILLIGRAM(S): at 05:25

## 2022-07-07 RX ADMIN — Medication 100 MILLIGRAM(S): at 22:22

## 2022-07-07 RX ADMIN — NORTRIPTYLINE HYDROCHLORIDE 10 MILLIGRAM(S): 10 CAPSULE ORAL at 22:21

## 2022-07-07 RX ADMIN — Medication 600 MILLIGRAM(S): at 22:22

## 2022-07-07 RX ADMIN — TRAMADOL HYDROCHLORIDE 25 MILLIGRAM(S): 50 TABLET ORAL at 01:20

## 2022-07-07 RX ADMIN — TIZANIDINE 2 MILLIGRAM(S): 4 TABLET ORAL at 06:06

## 2022-07-07 RX ADMIN — Medication 100 MILLIGRAM(S): at 13:57

## 2022-07-07 RX ADMIN — Medication 100 MILLIGRAM(S): at 12:49

## 2022-07-07 RX ADMIN — Medication 600 MILLIGRAM(S): at 13:58

## 2022-07-07 RX ADMIN — TRAMADOL HYDROCHLORIDE 25 MILLIGRAM(S): 50 TABLET ORAL at 11:39

## 2022-07-07 RX ADMIN — Medication 100 MILLIGRAM(S): at 12:48

## 2022-07-07 RX ADMIN — TRAMADOL HYDROCHLORIDE 25 MILLIGRAM(S): 50 TABLET ORAL at 12:10

## 2022-07-07 RX ADMIN — ENOXAPARIN SODIUM 40 MILLIGRAM(S): 100 INJECTION SUBCUTANEOUS at 18:01

## 2022-07-07 NOTE — DIETITIAN INITIAL EVALUATION ADULT - REASON FOR ADMISSION
22yo Female with PMH of high cholesterol and T2DM. Pt admitted on 6/28 with Neuralgia and neuritis.

## 2022-07-07 NOTE — DIETITIAN INITIAL EVALUATION ADULT - ADD RECOMMEND
1) Continue regular diet   2) Reinforce diet education PRN   3) Continue multivitamin, B1, B12, B6 daily   4) Monitor PO intake, diet tolerance, weight trends, labs, GI function, and skin integrity  5) Malnutrition sticker placed

## 2022-07-07 NOTE — DIETITIAN INITIAL EVALUATION ADULT - OTHER INFO
GI/Intake:   -Per flowsheet, % intake of meals  -Pt states she has been slowly implementing rice, bread and other carbohydrate sources back into her diet since admitted to [facility]  -No BM documented thus far; bowel regimen ordered (Senna)     Endo:   -Hx of T2DM   -Latest A1c: 4.8% - controlled   -Pt was previously on Steglatro x5 months; Metformin lead to GI side effects and was d/c after one dose   -No insulin regimen noted in-house    Renal:   -Hyperphosphatemic     Weight Hx:  -Current dosing weight: 164 pounds   -UBW prior to knowledge of T2DM dx: 250 pounds (January 2022)    Education:  -Educated pt on importance of implementing whole grain sources of carbohydrates into diet; provided examples of appropriate portion sizes   -Provided overall healthy nutrition therapy via verbal and written documents

## 2022-07-07 NOTE — DIETITIAN INITIAL EVALUATION ADULT - PERTINENT LABORATORY DATA
07-07    136  |  103  |  12  ----------------------------<  81  3.5   |  21<L>  |  0.53    Ca    8.9      07 Jul 2022 07:13  Phos  4.8     07-07  Mg     2.0     07-07    TPro  6.5  /  Alb  3.6  /  TBili  0.3  /  DBili  x   /  AST  24  /  ALT  63<H>  /  AlkPhos  51  07-07  A1C with Estimated Average Glucose Result: 4.8 % (06-28-22 @ 07:11)  A1C with Estimated Average Glucose Result: 4.8 % (06-27-22 @ 23:56)

## 2022-07-07 NOTE — DIETITIAN INITIAL EVALUATION ADULT - ORAL INTAKE PTA/DIET HISTORY
PTA per pt  -Intake: prior to dx of T2DM, pt endorses consuming fast food and fried foods frequently; since dx in January, pt states she typically skip breakfast and consume salad with grilled chicken salad lunch and dinner; endorses apprehension to eat carbohydrates since dx of T2DM   -Chewing/Swallowing: denies difficulty   -Allergies/Intolerances: watermelon, avocado, banana, pistachio - itchy throat   -Vitamins/Supplements: Vitamin B1, B6, B12, multivitamin

## 2022-07-07 NOTE — PROGRESS NOTE ADULT - PROBLEM SELECTOR PLAN 1
- Previous CT brain unremarkable. Previous LP unremarkable.   - Neurology consulted, appreciate recs.   - Increase Gabapentin to 600 mg TID.   - Ibuprofen ordered to be given with gabapentin doses.  - IV tylenol for breakthrough pain  - c/w ibuprofen 600 mg TID.   - Follow-up pending laboratory studies.   - Folate level < 2.0. Will supplement.   - MRI C-spine and T-spine unremarkable.  - U/S duplex b/l LE unremarkable   - Changed pamelor to 10 mg qd   - Per neurology, contractions and leg pain are associated with EMG findings of demyelinating neuropathy, which can be explained by the folate deficiency.   - Pain management consulted. Appreciate new recommendations. Will follow-up regarding pain management. Per pain management, decreased Pamelor to once every other day for 1 week, then dc.   - Podiatry consulted for possible pain management options, appreciate recs.   - Vascular surgery consulted, appreciate recs.  - Podiatry consulted, appreciate recs. Per podiatry, no indication for intervention at this time. - Previous CT brain unremarkable. Previous LP unremarkable.   - Neurology consulted, appreciate recs.   - Increase Gabapentin to 600 mg TID.   - Ibuprofen ordered to be given with gabapentin doses.  - IV tylenol for breakthrough pain  - c/w ibuprofen 600 mg TID.   - Follow-up pending laboratory studies.   - Folate level < 2.0. Will supplement.   - MRI C-spine and T-spine unremarkable.  - U/S duplex b/l LE unremarkable   - Changed pamelor to 10 mg qd   - Per neurology, contractions and leg pain are associated with EMG findings of demyelinating neuropathy, which can be explained by the folate deficiency.   - Pain management consulted. Appreciate new recommendations. Will follow-up regarding pain management. Per pain management, decreased Pamelor to once every other day for 1 week, then dc. Will continue on current pain medication and ensure patient is given tramadol at bedtime for persistent overnight pain.   - Podiatry consulted for possible pain management options, appreciate recs.   - Vascular surgery consulted, appreciate recs.  - Podiatry consulted, appreciate recs. Per podiatry, no indication for intervention at this time.

## 2022-07-07 NOTE — DIETITIAN INITIAL EVALUATION ADULT - ETIOLOGY
related to inadequate protein-energy intake for prolonged period of time in the setting of newly dx T2DM

## 2022-07-07 NOTE — PROGRESS NOTE ADULT - SUBJECTIVE AND OBJECTIVE BOX
PROGRESS NOTE:     Patient is a 21y old  Female who presents with a chief complaint of Neuropathy (06 Jul 2022 14:05)      SUBJECTIVE / OVERNIGHT EVENTS:    No acute events overnight. Patient examined at bedside with no acute complaints.     Pain:  Bowel Movements:  Urination:  OOB:  PT:    REVIEW OF SYSTEMS:    CONSTITUTIONAL: No weakness, fevers or chills  EYES/ENT: No visual changes;  No vertigo or throat pain   NECK: No pain or stiffness  RESPIRATORY: No cough, wheezing, hemoptysis; No shortness of breath  CARDIOVASCULAR: No chest pain or palpitations  GASTROINTESTINAL: No abdominal or epigastric pain. No nausea, vomiting, or hematemesis; No diarrhea or constipation. No melena or hematochezia.  GENITOURINARY: No dysuria, frequency or hematuria  NEUROLOGICAL: No numbness or weakness  SKIN: No itching, rashes      MEDICATIONS  (STANDING):  cyanocobalamin 1000 MICROGram(s) Oral daily  enoxaparin Injectable 40 milliGRAM(s) SubCutaneous every 24 hours  folic acid 5 milliGRAM(s) Oral daily  ibuprofen  Tablet. 600 milliGRAM(s) Oral three times a day  influenza   Vaccine 0.5 milliLiter(s) IntraMuscular once  lidocaine/prilocaine Cream 1 Application(s) Topical daily  multivitamin/minerals 1 Tablet(s) Oral daily  nortriptyline 10 milliGRAM(s) Oral <User Schedule>  pregabalin 100 milliGRAM(s) Oral every 8 hours  pyridoxine 100 milliGRAM(s) Oral daily  thiamine 100 milliGRAM(s) Oral daily    MEDICATIONS  (PRN):  acetaminophen     Tablet .. 650 milliGRAM(s) Oral every 6 hours PRN Temp greater or equal to 38C (100.4F), Mild Pain (1 - 3)  melatonin 3 milliGRAM(s) Oral at bedtime PRN Insomnia  senna 1 Tablet(s) Oral daily PRN Constipation  tiZANidine 2 milliGRAM(s) Oral every 8 hours PRN Muscle Spasm  traMADol 25 milliGRAM(s) Oral every 6 hours PRN Severe Pain (7 - 10)      CAPILLARY BLOOD GLUCOSE        I&O's Summary    05 Jul 2022 07:01  -  06 Jul 2022 07:00  --------------------------------------------------------  IN: 80 mL / OUT: 0 mL / NET: 80 mL        VITALS:   T(C): 37.7 (07-07-22 @ 04:19), Max: 37.7 (07-07-22 @ 04:19)  HR: 110 (07-07-22 @ 04:19) (96 - 126)  BP: 132/83 (07-07-22 @ 04:19) (127/93 - 141/95)  RR: 18 (07-07-22 @ 04:19) (18 - 18)  SpO2: 98% (07-07-22 @ 04:19) (98% - 99%)    GENERAL: NAD, lying in bed comfortably  HEAD:  Atraumatic, normocephalic  EYES: EOMI, PERRLA, conjunctiva and sclera clear  ENT: Moist mucous membranes  NECK: Supple, no JVD  HEART: Regular rate and rhythm, no murmurs, rubs, or gallops  LUNGS: Unlabored respirations.  Clear to auscultation bilaterally, no crackles, wheezing, or rhonchi  ABDOMEN: Soft, nontender, nondistended, +BS  EXTREMITIES: 2+ peripheral pulses bilaterally. No clubbing, cyanosis, or edema  NERVOUS SYSTEM:  A&Ox3, no focal deficits   SKIN: No rashes or lesions    LABS:                        12.4   7.68  )-----------( 313      ( 06 Jul 2022 07:47 )             38.5     07-06    138  |  102  |  13  ----------------------------<  73  4.1   |  21<L>  |  0.48<L>    Ca    9.4      06 Jul 2022 07:44  Phos  4.8     07-06  Mg     2.0     07-06    TPro  6.9  /  Alb  3.9  /  TBili  0.4  /  DBili  x   /  AST  24  /  ALT  62<H>  /  AlkPhos  54  07-06                RADIOLOGY & ADDITIONAL TESTS:  Results Reviewed:   Imaging Personally Reviewed:  Electrocardiogram Personally Reviewed:    COORDINATION OF CARE:  Care Discussed with Consultants/Other Providers [Y/N]:  Prior or Outpatient Records Reviewed [Y/N]:   PROGRESS NOTE:     Patient is a 21y old  Female who presents with a chief complaint of Neuropathy (06 Jul 2022 14:05)      SUBJECTIVE / OVERNIGHT EVENTS:    No acute events overnight. Patient examined at bedside and reports 6/10 pain, increased overnight from controlled 3/10 pain. Patient reports refusing tramadol dose at bedtime due to lack of need.     Pain: 3/10   Bowel Movements: regular   Urination: regular   OOB: as tolerated   PT: following     REVIEW OF SYSTEMS:    CONSTITUTIONAL: No weakness, fevers or chills  EYES/ENT: No visual changes;  No vertigo or throat pain   NECK: No pain or stiffness  RESPIRATORY: No cough, wheezing, hemoptysis; No shortness of breath  CARDIOVASCULAR: No chest pain or palpitations  GASTROINTESTINAL: No abdominal or epigastric pain. No nausea, vomiting, or hematemesis; No diarrhea or constipation. No melena or hematochezia.  GENITOURINARY: No dysuria, frequency or hematuria  NEUROLOGICAL: + numbness + weakness  SKIN: No itching, rashes      MEDICATIONS  (STANDING):  cyanocobalamin 1000 MICROGram(s) Oral daily  enoxaparin Injectable 40 milliGRAM(s) SubCutaneous every 24 hours  folic acid 5 milliGRAM(s) Oral daily  ibuprofen  Tablet. 600 milliGRAM(s) Oral three times a day  influenza   Vaccine 0.5 milliLiter(s) IntraMuscular once  lidocaine/prilocaine Cream 1 Application(s) Topical daily  multivitamin/minerals 1 Tablet(s) Oral daily  nortriptyline 10 milliGRAM(s) Oral <User Schedule>  pregabalin 100 milliGRAM(s) Oral every 8 hours  pyridoxine 100 milliGRAM(s) Oral daily  thiamine 100 milliGRAM(s) Oral daily    MEDICATIONS  (PRN):  acetaminophen     Tablet .. 650 milliGRAM(s) Oral every 6 hours PRN Temp greater or equal to 38C (100.4F), Mild Pain (1 - 3)  melatonin 3 milliGRAM(s) Oral at bedtime PRN Insomnia  senna 1 Tablet(s) Oral daily PRN Constipation  tiZANidine 2 milliGRAM(s) Oral every 8 hours PRN Muscle Spasm  traMADol 25 milliGRAM(s) Oral every 6 hours PRN Severe Pain (7 - 10)      CAPILLARY BLOOD GLUCOSE        I&O's Summary    05 Jul 2022 07:01  -  06 Jul 2022 07:00  --------------------------------------------------------  IN: 80 mL / OUT: 0 mL / NET: 80 mL        VITALS:   T(C): 37.7 (07-07-22 @ 04:19), Max: 37.7 (07-07-22 @ 04:19)  HR: 110 (07-07-22 @ 04:19) (96 - 126)  BP: 132/83 (07-07-22 @ 04:19) (127/93 - 141/95)  RR: 18 (07-07-22 @ 04:19) (18 - 18)  SpO2: 98% (07-07-22 @ 04:19) (98% - 99%)    GENERAL: NAD, lying in bed comfortably  HEAD:  Atraumatic, normocephalic  EYES: EOMI, PERRLA, conjunctiva and sclera clear  ENT: Moist mucous membranes  NECK: Supple, no JVD  HEART: Regular rate and rhythm, no murmurs, rubs, or gallops  LUNGS: Unlabored respirations.  Clear to auscultation bilaterally, no crackles, wheezing, or rhonchi  ABDOMEN: Soft, nontender, nondistended, +BS  EXTREMITIES: 2+ peripheral pulses bilaterally. No clubbing, cyanosis, or edema  NERVOUS SYSTEM:  A&Ox3, + numbness + weakness on b/l legs   SKIN: No rashes or lesions    LABS:                        12.4   7.68  )-----------( 313      ( 06 Jul 2022 07:47 )             38.5     07-06    138  |  102  |  13  ----------------------------<  73  4.1   |  21<L>  |  0.48<L>    Ca    9.4      06 Jul 2022 07:44  Phos  4.8     07-06  Mg     2.0     07-06    TPro  6.9  /  Alb  3.9  /  TBili  0.4  /  DBili  x   /  AST  24  /  ALT  62<H>  /  AlkPhos  54  07-06                RADIOLOGY & ADDITIONAL TESTS:  Results Reviewed:     Comprehensive Metabolic Panel in AM (07.07.22 @ 07:13)   Sodium, Serum: 136 mmol/L   Potassium, Serum: 3.5 mmol/L   Chloride, Serum: 103 mmol/L   Carbon Dioxide, Serum: 21 mmol/L   Anion Gap, Serum: 12 mmol/L   Blood Urea Nitrogen, Serum: 12 mg/dL   Creatinine, Serum: 0.53 mg/dL   Glucose, Serum: 81 mg/dL   Calcium, Total Serum: 8.9 mg/dL   Protein Total, Serum: 6.5 g/dL   Albumin, Serum: 3.6 g/dL   Bilirubin Total, Serum: 0.3 mg/dL   Alkaline Phosphatase, Serum: 51 U/L   Aspartate Aminotransferase (AST/SGOT): 24 U/L   Alanine Aminotransferase (ALT/SGPT): 63 U/L   eGFR: 134

## 2022-07-07 NOTE — DIETITIAN INITIAL EVALUATION ADULT - PERTINENT MEDS FT
MEDICATIONS  (STANDING):  cyanocobalamin 1000 MICROGram(s) Oral daily  enoxaparin Injectable 40 milliGRAM(s) SubCutaneous every 24 hours  folic acid 5 milliGRAM(s) Oral daily  ibuprofen  Tablet. 600 milliGRAM(s) Oral three times a day  influenza   Vaccine 0.5 milliLiter(s) IntraMuscular once  lidocaine/prilocaine Cream 1 Application(s) Topical daily  multivitamin/minerals 1 Tablet(s) Oral daily  nortriptyline 10 milliGRAM(s) Oral <User Schedule>  pregabalin 100 milliGRAM(s) Oral every 8 hours  pyridoxine 100 milliGRAM(s) Oral daily  thiamine 100 milliGRAM(s) Oral daily    MEDICATIONS  (PRN):  acetaminophen     Tablet .. 650 milliGRAM(s) Oral every 6 hours PRN Temp greater or equal to 38C (100.4F), Mild Pain (1 - 3)  melatonin 3 milliGRAM(s) Oral at bedtime PRN Insomnia  senna 1 Tablet(s) Oral daily PRN Constipation  tiZANidine 2 milliGRAM(s) Oral every 8 hours PRN Muscle Spasm  traMADol 25 milliGRAM(s) Oral every 6 hours PRN Severe Pain (7 - 10)

## 2022-07-08 ENCOUNTER — TRANSCRIPTION ENCOUNTER (OUTPATIENT)
Age: 22
End: 2022-07-08

## 2022-07-08 VITALS
DIASTOLIC BLOOD PRESSURE: 87 MMHG | OXYGEN SATURATION: 99 % | HEART RATE: 102 BPM | TEMPERATURE: 98 F | RESPIRATION RATE: 18 BRPM | SYSTOLIC BLOOD PRESSURE: 133 MMHG

## 2022-07-08 LAB
ALBUMIN SERPL ELPH-MCNC: 4.2 G/DL — SIGNIFICANT CHANGE UP (ref 3.3–5)
ALP SERPL-CCNC: 56 U/L — SIGNIFICANT CHANGE UP (ref 40–120)
ALT FLD-CCNC: 76 U/L — HIGH (ref 10–45)
ANION GAP SERPL CALC-SCNC: 15 MMOL/L — SIGNIFICANT CHANGE UP (ref 5–17)
AST SERPL-CCNC: 25 U/L — SIGNIFICANT CHANGE UP (ref 10–40)
BILIRUB SERPL-MCNC: 0.4 MG/DL — SIGNIFICANT CHANGE UP (ref 0.2–1.2)
BUN SERPL-MCNC: 12 MG/DL — SIGNIFICANT CHANGE UP (ref 7–23)
CALCIUM SERPL-MCNC: 9.5 MG/DL — SIGNIFICANT CHANGE UP (ref 8.4–10.5)
CHLORIDE SERPL-SCNC: 101 MMOL/L — SIGNIFICANT CHANGE UP (ref 96–108)
CO2 SERPL-SCNC: 22 MMOL/L — SIGNIFICANT CHANGE UP (ref 22–31)
CREAT SERPL-MCNC: 0.5 MG/DL — SIGNIFICANT CHANGE UP (ref 0.5–1.3)
EGFR: 136 ML/MIN/1.73M2 — SIGNIFICANT CHANGE UP
GLUCOSE SERPL-MCNC: 85 MG/DL — SIGNIFICANT CHANGE UP (ref 70–99)
HCT VFR BLD CALC: 39.1 % — SIGNIFICANT CHANGE UP (ref 34.5–45)
HGB BLD-MCNC: 12.6 G/DL — SIGNIFICANT CHANGE UP (ref 11.5–15.5)
MAGNESIUM SERPL-MCNC: 1.9 MG/DL — SIGNIFICANT CHANGE UP (ref 1.6–2.6)
MCHC RBC-ENTMCNC: 27.4 PG — SIGNIFICANT CHANGE UP (ref 27–34)
MCHC RBC-ENTMCNC: 32.2 GM/DL — SIGNIFICANT CHANGE UP (ref 32–36)
MCV RBC AUTO: 85 FL — SIGNIFICANT CHANGE UP (ref 80–100)
NRBC # BLD: 0 /100 WBCS — SIGNIFICANT CHANGE UP (ref 0–0)
PHOSPHATE SERPL-MCNC: 4 MG/DL — SIGNIFICANT CHANGE UP (ref 2.5–4.5)
PLATELET # BLD AUTO: 304 K/UL — SIGNIFICANT CHANGE UP (ref 150–400)
POTASSIUM SERPL-MCNC: 3.6 MMOL/L — SIGNIFICANT CHANGE UP (ref 3.5–5.3)
POTASSIUM SERPL-SCNC: 3.6 MMOL/L — SIGNIFICANT CHANGE UP (ref 3.5–5.3)
PROT SERPL-MCNC: 7.4 G/DL — SIGNIFICANT CHANGE UP (ref 6–8.3)
RBC # BLD: 4.6 M/UL — SIGNIFICANT CHANGE UP (ref 3.8–5.2)
RBC # FLD: 16.7 % — HIGH (ref 10.3–14.5)
SODIUM SERPL-SCNC: 138 MMOL/L — SIGNIFICANT CHANGE UP (ref 135–145)
WBC # BLD: 8.44 K/UL — SIGNIFICANT CHANGE UP (ref 3.8–10.5)
WBC # FLD AUTO: 8.44 K/UL — SIGNIFICANT CHANGE UP (ref 3.8–10.5)

## 2022-07-08 PROCEDURE — 82390 ASSAY OF CERULOPLASMIN: CPT

## 2022-07-08 PROCEDURE — 84436 ASSAY OF TOTAL THYROXINE: CPT

## 2022-07-08 PROCEDURE — 86038 ANTINUCLEAR ANTIBODIES: CPT

## 2022-07-08 PROCEDURE — 84100 ASSAY OF PHOSPHORUS: CPT

## 2022-07-08 PROCEDURE — 83090 ASSAY OF HOMOCYSTEINE: CPT

## 2022-07-08 PROCEDURE — 83921 ORGANIC ACID SINGLE QUANT: CPT

## 2022-07-08 PROCEDURE — 72141 MRI NECK SPINE W/O DYE: CPT

## 2022-07-08 PROCEDURE — 97116 GAIT TRAINING THERAPY: CPT

## 2022-07-08 PROCEDURE — 85730 THROMBOPLASTIN TIME PARTIAL: CPT

## 2022-07-08 PROCEDURE — 86618 LYME DISEASE ANTIBODY: CPT

## 2022-07-08 PROCEDURE — 93925 LOWER EXTREMITY STUDY: CPT

## 2022-07-08 PROCEDURE — 84480 ASSAY TRIIODOTHYRONINE (T3): CPT

## 2022-07-08 PROCEDURE — 84425 ASSAY OF VITAMIN B-1: CPT

## 2022-07-08 PROCEDURE — 93923 UPR/LXTR ART STDY 3+ LVLS: CPT

## 2022-07-08 PROCEDURE — 82784 ASSAY IGA/IGD/IGG/IGM EACH: CPT

## 2022-07-08 PROCEDURE — 86780 TREPONEMA PALLIDUM: CPT

## 2022-07-08 PROCEDURE — 84443 ASSAY THYROID STIM HORMONE: CPT

## 2022-07-08 PROCEDURE — 80053 COMPREHEN METABOLIC PANEL: CPT

## 2022-07-08 PROCEDURE — 84446 ASSAY OF VITAMIN E: CPT

## 2022-07-08 PROCEDURE — 85652 RBC SED RATE AUTOMATED: CPT

## 2022-07-08 PROCEDURE — 99285 EMERGENCY DEPT VISIT HI MDM: CPT | Mod: 25

## 2022-07-08 PROCEDURE — 97162 PT EVAL MOD COMPLEX 30 MIN: CPT

## 2022-07-08 PROCEDURE — 82164 ANGIOTENSIN I ENZYME TEST: CPT

## 2022-07-08 PROCEDURE — 82746 ASSAY OF FOLIC ACID SERUM: CPT

## 2022-07-08 PROCEDURE — 83036 HEMOGLOBIN GLYCOSYLATED A1C: CPT

## 2022-07-08 PROCEDURE — 72146 MRI CHEST SPINE W/O DYE: CPT

## 2022-07-08 PROCEDURE — 84702 CHORIONIC GONADOTROPIN TEST: CPT

## 2022-07-08 PROCEDURE — 97165 OT EVAL LOW COMPLEX 30 MIN: CPT

## 2022-07-08 PROCEDURE — 83540 ASSAY OF IRON: CPT

## 2022-07-08 PROCEDURE — U0003: CPT

## 2022-07-08 PROCEDURE — 82175 ASSAY OF ARSENIC: CPT

## 2022-07-08 PROCEDURE — 86140 C-REACTIVE PROTEIN: CPT

## 2022-07-08 PROCEDURE — 83735 ASSAY OF MAGNESIUM: CPT

## 2022-07-08 PROCEDURE — 80076 HEPATIC FUNCTION PANEL: CPT

## 2022-07-08 PROCEDURE — 87389 HIV-1 AG W/HIV-1&-2 AB AG IA: CPT

## 2022-07-08 PROCEDURE — 83516 IMMUNOASSAY NONANTIBODY: CPT

## 2022-07-08 PROCEDURE — U0005: CPT

## 2022-07-08 PROCEDURE — 82607 VITAMIN B-12: CPT

## 2022-07-08 PROCEDURE — 83550 IRON BINDING TEST: CPT

## 2022-07-08 PROCEDURE — 86431 RHEUMATOID FACTOR QUANT: CPT

## 2022-07-08 PROCEDURE — 84165 PROTEIN E-PHORESIS SERUM: CPT

## 2022-07-08 PROCEDURE — 36415 COLL VENOUS BLD VENIPUNCTURE: CPT

## 2022-07-08 PROCEDURE — 85027 COMPLETE CBC AUTOMATED: CPT

## 2022-07-08 PROCEDURE — 93005 ELECTROCARDIOGRAM TRACING: CPT

## 2022-07-08 PROCEDURE — 81376 HLA II TYPING 1 LOCUS LR: CPT

## 2022-07-08 PROCEDURE — 84155 ASSAY OF PROTEIN SERUM: CPT

## 2022-07-08 PROCEDURE — 82728 ASSAY OF FERRITIN: CPT

## 2022-07-08 PROCEDURE — 84207 ASSAY OF VITAMIN B-6: CPT

## 2022-07-08 PROCEDURE — 83655 ASSAY OF LEAD: CPT

## 2022-07-08 PROCEDURE — 85025 COMPLETE CBC W/AUTO DIFF WBC: CPT

## 2022-07-08 PROCEDURE — 82525 ASSAY OF COPPER: CPT

## 2022-07-08 PROCEDURE — 82962 GLUCOSE BLOOD TEST: CPT

## 2022-07-08 PROCEDURE — 85610 PROTHROMBIN TIME: CPT

## 2022-07-08 PROCEDURE — 97530 THERAPEUTIC ACTIVITIES: CPT

## 2022-07-08 PROCEDURE — 86036 ANCA SCREEN EACH ANTIBODY: CPT

## 2022-07-08 RX ORDER — TRAMADOL HYDROCHLORIDE 50 MG/1
0.5 TABLET ORAL
Qty: 10 | Refills: 0
Start: 2022-07-08 | End: 2022-07-17

## 2022-07-08 RX ORDER — ACETAMINOPHEN 500 MG
2 TABLET ORAL
Qty: 0 | Refills: 0 | DISCHARGE
Start: 2022-07-08

## 2022-07-08 RX ORDER — TRAMADOL HYDROCHLORIDE 50 MG/1
25 TABLET ORAL
Refills: 0 | Status: DISCONTINUED | OUTPATIENT
Start: 2022-07-08 | End: 2022-07-08

## 2022-07-08 RX ORDER — IBUPROFEN 200 MG
1 TABLET ORAL
Qty: 90 | Refills: 0
Start: 2022-07-08 | End: 2022-08-06

## 2022-07-08 RX ORDER — SENNA PLUS 8.6 MG/1
1 TABLET ORAL
Qty: 30 | Refills: 0
Start: 2022-07-08 | End: 2022-08-06

## 2022-07-08 RX ORDER — TIZANIDINE 4 MG/1
1 TABLET ORAL
Qty: 90 | Refills: 0
Start: 2022-07-08 | End: 2022-08-06

## 2022-07-08 RX ORDER — GABAPENTIN 400 MG/1
1 CAPSULE ORAL
Qty: 60 | Refills: 0
Start: 2022-07-08 | End: 2022-08-06

## 2022-07-08 RX ORDER — LIDOCAINE AND PRILOCAINE CREAM 25; 25 MG/G; MG/G
1 CREAM TOPICAL
Qty: 1 | Refills: 0
Start: 2022-07-08 | End: 2022-08-06

## 2022-07-08 RX ORDER — GABAPENTIN 400 MG/1
1 CAPSULE ORAL
Qty: 0 | Refills: 0 | DISCHARGE

## 2022-07-08 RX ORDER — FOLIC ACID 0.8 MG
5 TABLET ORAL
Qty: 150 | Refills: 0
Start: 2022-07-08 | End: 2022-08-06

## 2022-07-08 RX ORDER — LANOLIN ALCOHOL/MO/W.PET/CERES
1 CREAM (GRAM) TOPICAL
Qty: 30 | Refills: 0
Start: 2022-07-08 | End: 2022-08-06

## 2022-07-08 RX ORDER — TIZANIDINE 4 MG/1
1 TABLET ORAL
Qty: 60 | Refills: 0
Start: 2022-07-08 | End: 2022-08-06

## 2022-07-08 RX ADMIN — Medication 600 MILLIGRAM(S): at 00:00

## 2022-07-08 RX ADMIN — TRAMADOL HYDROCHLORIDE 25 MILLIGRAM(S): 50 TABLET ORAL at 04:38

## 2022-07-08 RX ADMIN — TRAMADOL HYDROCHLORIDE 25 MILLIGRAM(S): 50 TABLET ORAL at 19:05

## 2022-07-08 RX ADMIN — Medication 5 MILLIGRAM(S): at 11:56

## 2022-07-08 RX ADMIN — TRAMADOL HYDROCHLORIDE 25 MILLIGRAM(S): 50 TABLET ORAL at 05:34

## 2022-07-08 RX ADMIN — Medication 1 TABLET(S): at 11:56

## 2022-07-08 RX ADMIN — PREGABALIN 1000 MICROGRAM(S): 225 CAPSULE ORAL at 11:55

## 2022-07-08 RX ADMIN — Medication 100 MILLIGRAM(S): at 11:55

## 2022-07-08 RX ADMIN — TRAMADOL HYDROCHLORIDE 25 MILLIGRAM(S): 50 TABLET ORAL at 12:30

## 2022-07-08 RX ADMIN — Medication 100 MILLIGRAM(S): at 06:01

## 2022-07-08 RX ADMIN — Medication 600 MILLIGRAM(S): at 06:01

## 2022-07-08 RX ADMIN — Medication 100 MILLIGRAM(S): at 14:46

## 2022-07-08 RX ADMIN — Medication 600 MILLIGRAM(S): at 07:35

## 2022-07-08 RX ADMIN — Medication 600 MILLIGRAM(S): at 14:47

## 2022-07-08 RX ADMIN — TRAMADOL HYDROCHLORIDE 25 MILLIGRAM(S): 50 TABLET ORAL at 11:55

## 2022-07-08 NOTE — DISCHARGE NOTE PROVIDER - CARE PROVIDER_API CALL
Bria Silva)  Internal Medicine  [address]  [place] [address]  Phone: [phone]  Fax: [phone]  Established Patient  Follow Up Time:     FAITH PLUMMER  Internal Medicine  [address], [address]  [place] [address]  Phone: [phone]  Fax: [phone]  Follow Up Time:

## 2022-07-08 NOTE — PROGRESS NOTE ADULT - NUTRITIONAL ASSESSMENT
This patient has been assessed with a concern for Malnutrition and has been determined to have a diagnosis/diagnoses of Severe protein-calorie malnutrition.    This patient is being managed with:   Diet Regular-  Entered: Jun 28 2022  3:03PM

## 2022-07-08 NOTE — DISCHARGE NOTE PROVIDER - DETAILS OF MALNUTRITION DIAGNOSIS/DIAGNOSES
This patient has been assessed with a concern for Malnutrition and was treated during this hospitalization for the following Nutrition diagnosis/diagnoses:     -  07/07/2022: Severe protein-calorie malnutrition

## 2022-07-08 NOTE — PROGRESS NOTE ADULT - REASON FOR ADMISSION
Neuropathy

## 2022-07-08 NOTE — DISCHARGE NOTE PROVIDER - HOSPITAL COURSE
20 yo F with PMHx of DM and HDL no longer on medication presents with bilateral foot pain and numbing for 1 month. Patient was seen by outpatient neurologist who performed EMG, results unremarkable, and sent patient to the ED on 7/21/2022 for workup to r/o GBS. CT brain w/o contrast and LP were both done during that ED visit, and both were unremarkable. Patient reports pain worsened progressively over the past 1 week, with swelling of the feet and inability to ambulate. Patient reported pain worsens upon movement and felt as if her feet are "asleep". Pain was 8/10 in severity. CT head was unremarkable, and patient was started on Gabapentin 300 mg TID and Pamelor. Neurology was consulted, offered recommendations, and patient was found to be folate deficient. Folate was repleted, but pain was persistent and pain management was consulted. Over the course of her hospital stay, patient was started on Tramadol as pain continued to persist. PT was consulted and recommended home PT. Pain improved after patient was started on tramadol and Lyrica. Repeat folate levels were within normal limits and further workup, including vascular pathology workup, was negative. Duplex of her legs, PILO, and other workup was negative. After her pain was controlled, she was deemed clear for medical discharge for follow-up with her outpatient neurologist.

## 2022-07-08 NOTE — DISCHARGE NOTE NURSING/CASE MANAGEMENT/SOCIAL WORK - NSDCPEFALRISK_GEN_ALL_CORE
For information on Fall & Injury Prevention, visit: [email or web address] OR  [email or web address] OR  https://www.cdc.gov/steadi/patient.html

## 2022-07-08 NOTE — PROGRESS NOTE ADULT - PROBLEM SELECTOR PROBLEM 2
Stable diabetes mellitus

## 2022-07-08 NOTE — PROGRESS NOTE ADULT - PROVIDER SPECIALTY LIST ADULT
Pain Medicine
Internal Medicine
Internal Medicine
Podiatry
Pain Medicine
Pain Medicine
Internal Medicine

## 2022-07-08 NOTE — DISCHARGE NOTE PROVIDER - NSDCMRMEDTOKEN_GEN_ALL_CORE_FT
acetaminophen 325 mg oral tablet: 2 tab(s) orally every 6 hours, As needed, Temp greater or equal to 38C (100.4F), Mild Pain (1 - 3)  Daily Multi oral tablet: 1 tab(s) orally once a day  folic acid 1 mg oral tablet: 5 tab(s) orally once a day  gabapentin 300 mg oral capsule: 1 cap(s) orally 2 times a day MDD:600 mg  ibuprofen 600 mg oral tablet: 1 tab(s) orally 3 times a day, As Needed MDD:1800 mg  lidocaine-prilocaine 2.5%-2.5% topical cream: 1 application topically once a day MDD:1 application  melatonin 3 mg oral tablet: 1 tab(s) orally once a day (at bedtime), As needed, Insomnia  nortriptyline 10 mg oral capsule: 1 cap(s) orally every other day at bedtime MDD:10 mg  pregabalin 100 mg oral capsule: 1 cap(s) orally 2 times a day in the morning and in the afternoon   pregabalin 150 mg oral capsule: 1 cap(s) orally once a day (at bedtime)  senna leaf extract oral tablet: 1 tab(s) orally once a day, As needed, Constipation  tiZANidine 2 mg oral tablet: 1 tab(s) orally every 8 hours, As needed, Muscle Spasm MDD:6 mg  traMADol 50 mg oral tablet: 0.5 tab(s) orally 2 times a day, As needed, Severe Pain (7 - 10) MDD:50 mg  Vitamin B1 100 mg oral tablet: 1 tab(s) orally once a day  Vitamin B12 2500 mcg sublingual tablet: 2 tab(s) sublingual once a day  Vitamin B6 100 mg oral tablet: 2 tab(s) orally once a day   acetaminophen 325 mg oral tablet: 2 tab(s) orally every 6 hours, As needed, Temp greater or equal to 38C (100.4F), Mild Pain (1 - 3)  Daily Multi oral tablet: 1 tab(s) orally once a day  folic acid 1 mg oral tablet: 5 tab(s) orally once a day  gabapentin 300 mg oral capsule: 1 cap(s) orally 2 times a day MDD:600 mg  ibuprofen 600 mg oral tablet: 1 tab(s) orally 3 times a day, As Needed MDD:1800 mg  lidocaine-prilocaine 2.5%-2.5% topical cream: 1 application topically once a day MDD:1 application  melatonin 3 mg oral tablet: 1 tab(s) orally once a day (at bedtime), As needed, Insomnia  nortriptyline 10 mg oral capsule: 1 cap(s) orally every other day at bedtime MDD:10 mg  Outpatient PT: Outpatient PT for ICD-10: M79.2  pregabalin 100 mg oral capsule: 1 cap(s) orally 2 times a day in the morning and in the afternoon   pregabalin 100 mg oral capsule: 1 cap(s) orally 2 times a day in the morning and in the afternoon MDD:MDD: 200mg (2 tabs)  pregabalin 150 mg oral capsule: 1 cap(s) orally once a day (at bedtime) MDD:MDD: 150mg  pregabalin 150 mg oral capsule: 1 cap(s) orally once a day (at bedtime)  Rolling Walker1: Rolling Walker for ICD-10: M79.2  senna leaf extract oral tablet: 1 tab(s) orally once a day, As needed, Constipation  tiZANidine 2 mg oral tablet: 1 tab(s) orally 2 times a day, As Needed -Muscle Spasm MDD:4 mg  traMADol 50 mg oral tablet: 0.5 tab(s) orally 2 times a day, As needed, Severe Pain (7 - 10) MDD:50 mg  traMADol 50 mg oral tablet: 0.5 tab(s) orally 2 times a day, As needed, Severe Pain (7 - 10) MDD:50mg  Vitamin B1 100 mg oral tablet: 1 tab(s) orally once a day  Vitamin B12 2500 mcg sublingual tablet: 2 tab(s) sublingual once a day  Vitamin B6 100 mg oral tablet: 2 tab(s) orally once a day   acetaminophen 325 mg oral tablet: 2 tab(s) orally every 6 hours, As needed, Temp greater or equal to 38C (100.4F), Mild Pain (1 - 3)  Daily Multi oral tablet: 1 tab(s) orally once a day  folic acid 1 mg oral tablet: 5 tab(s) orally once a day  gabapentin 300 mg oral capsule: 1 cap(s) orally 2 times a day MDD:600 mg  ibuprofen 600 mg oral tablet: 1 tab(s) orally 3 times a day, As Needed MDD:1800 mg  lidocaine-prilocaine 2.5%-2.5% topical cream: 1 application topically once a day MDD:1 application  melatonin 3 mg oral tablet: 1 tab(s) orally once a day (at bedtime), As needed, Insomnia  nortriptyline 10 mg oral capsule: 1 cap(s) orally every other day at bedtime MDD:10 mg  Outpatient PT: Outpatient PT for ICD-10: M79.2  pregabalin 100 mg oral capsule: 1 cap(s) orally 2 times a day in the morning and in the afternoon MDD:MDD: 200mg (2 tabs)  pregabalin 150 mg oral capsule: 1 cap(s) orally once a day (at bedtime) MDD:MDD: 150mg  Rolling Walker1: Rolling Walker for ICD-10: M79.2  senna leaf extract oral tablet: 1 tab(s) orally once a day, As needed, Constipation  tiZANidine 2 mg oral tablet: 1 tab(s) orally 2 times a day, As Needed -Muscle Spasm MDD:4 mg  traMADol 50 mg oral tablet: 0.5 tab(s) orally 2 times a day, As needed, Severe Pain (7 - 10) MDD:50mg  Vitamin B1 100 mg oral tablet: 1 tab(s) orally once a day  Vitamin B12 2500 mcg sublingual tablet: 2 tab(s) sublingual once a day  Vitamin B6 100 mg oral tablet: 2 tab(s) orally once a day

## 2022-07-08 NOTE — CHART NOTE - NSCHARTNOTEFT_GEN_A_CORE
21F PMHx DM and HLD, no longer on medication.    Admitted with B/L peripheral foot neuropathy - pain, burning, numbness, pins and needles  Possibly 2/2 folate deficiency    Out- patient pain regimen: Neurontin 300 mg TID  Out Patient Pain Management provider: none    EMR reviewed, much improvement in pain, controlled with current regimen. Pain level decreases to 0/10 and holds for long periods of time.  Only requiring one dose Tramadol and one dose Tizanidine daily.    Decrease Tramadol 25 mg to BID PRN severe pain (EKG on 7/5 OSs=677)  Decrease Tizanidine 2 mg to BID PRN spasm  Monitor QTc while on Tramadol  Lyrica 100 mg every 8 hours (CrCl = 207.1)  Emla cream trial to B/L feet  Continue Ibuprofen PRN  Bowel Regimen  Incentive Spirometer  PT per primary team    Signing off    [facility]  [phone] 21F PMHx DM and HLD, no longer on medication.    Admitted with B/L peripheral foot neuropathy - pain, burning, numbness, pins and needles  Possibly 2/2 folate deficiency    Out- patient pain regimen: Neurontin 300 mg TID  Out Patient Pain Management provider: none    EMR reviewed, much improvement in pain, controlled with current regimen. Pain level decreases to 0/10 and holds for long periods of time.  Only requiring one dose Tramadol and one dose Tizanidine daily.    Addendum - 7/8/2022 at 1410 - Case d/w primary team, pt is enjoying significant improvement on her new medications, but her main issue is her nighttime pain. She is feeling 10/10 pain and goes down to a 7/10 at best with Lyrica and Tramadol. Pain is controlled during the day, barely 3/10. Pt noticed that her pain seems to improve significantly when she is distracted, such as when she is talking with people. She is unable to replicate that during the night, as she feel she can’t distract herself properly.   Agreed to increase Lyrica at night to 150 mg (continue 100 mg morning and afternoon) to help with neuropathy and relaxation. [facility], they will see patient today for holistic therapies/relaxation techniques.    Decrease Tramadol 25 mg to BID PRN severe pain (EKG on 7/5 UJo=676)  Decrease Tizanidine 2 mg to BID PRN spasm  Monitor QTc while on Tramadol  Keep Lyrica 100 mg AM and afternoon, and increase night dose to 150 mg QHS (CrCl = 207.1)  Emla cream trial to B/L feet  Continue Ibuprofen PRN  Bowel Regimen  Incentive Spirometer  PT per primary team      [facility]ronic Pain Service  [phone]

## 2022-07-08 NOTE — DISCHARGE NOTE PROVIDER - NSDCCPCAREPLAN_GEN_ALL_CORE_FT
PRINCIPAL DISCHARGE DIAGNOSIS  Diagnosis: Neuropathic pain  Assessment and Plan of Treatment: You were diagnosed with neuropathic pain, suspected to be from the folate deficiency. Your nerves were likely deficient in myelin, which can occur when you are low in folate. As a result, your nerves were misfiring signals and you were feeling numbness, tingling, and pain. We replaced your folate and controlled your pain, but it may take time for the myelin to return and for the signals to be regulated again.       PRINCIPAL DISCHARGE DIAGNOSIS  Diagnosis: Neuropathic pain  Assessment and Plan of Treatment: You were diagnosed with neuropathic pain, suspected to be from the folate deficiency. Your nerves were likely deficient in myelin, which can occur when you are low in folate. As a result, your nerves were misfiring signals and you were feeling numbness, tingling, and pain. We replaced your folate and controlled your pain, but it may take time for the myelin to return and for the signals to be regulated again. Please follow up with your outpatient neurologist and PCP for further monitoring of your symptoms.

## 2022-07-08 NOTE — PROGRESS NOTE ADULT - PROBLEM SELECTOR PLAN 2
- a1c = 4.8   - Previously on Metformin and Steglatro. Now off medication.

## 2022-07-08 NOTE — PROGRESS NOTE ADULT - SUBJECTIVE AND OBJECTIVE BOX
PROGRESS NOTE:     Patient is a 21y old  Female who presents with a chief complaint of 20yo Female with PMH of high cholesterol and T2DM. Pt admitted on 6/28 with Neuralgia and neuritis.      (07 Jul 2022 15:08)      SUBJECTIVE / OVERNIGHT EVENTS:    No acute events overnight. Patient examined at bedside with no acute complaints.     Pain:  Bowel Movements:  Urination:  OOB:  PT:    REVIEW OF SYSTEMS:    CONSTITUTIONAL: No weakness, fevers or chills  EYES/ENT: No visual changes;  No vertigo or throat pain   NECK: No pain or stiffness  RESPIRATORY: No cough, wheezing, hemoptysis; No shortness of breath  CARDIOVASCULAR: No chest pain or palpitations  GASTROINTESTINAL: No abdominal or epigastric pain. No nausea, vomiting, or hematemesis; No diarrhea or constipation. No melena or hematochezia.  GENITOURINARY: No dysuria, frequency or hematuria  NEUROLOGICAL: No numbness or weakness  SKIN: No itching, rashes      MEDICATIONS  (STANDING):  cyanocobalamin 1000 MICROGram(s) Oral daily  enoxaparin Injectable 40 milliGRAM(s) SubCutaneous every 24 hours  folic acid 5 milliGRAM(s) Oral daily  ibuprofen  Tablet. 600 milliGRAM(s) Oral three times a day  influenza   Vaccine 0.5 milliLiter(s) IntraMuscular once  lidocaine/prilocaine Cream 1 Application(s) Topical daily  multivitamin/minerals 1 Tablet(s) Oral daily  nortriptyline 10 milliGRAM(s) Oral <User Schedule>  pregabalin 100 milliGRAM(s) Oral every 8 hours  pyridoxine 100 milliGRAM(s) Oral daily  thiamine 100 milliGRAM(s) Oral daily    MEDICATIONS  (PRN):  acetaminophen     Tablet .. 650 milliGRAM(s) Oral every 6 hours PRN Temp greater or equal to 38C (100.4F), Mild Pain (1 - 3)  melatonin 3 milliGRAM(s) Oral at bedtime PRN Insomnia  senna 1 Tablet(s) Oral daily PRN Constipation  tiZANidine 2 milliGRAM(s) Oral every 8 hours PRN Muscle Spasm  traMADol 25 milliGRAM(s) Oral every 6 hours PRN Severe Pain (7 - 10)      CAPILLARY BLOOD GLUCOSE        I&O's Summary      VITALS:   T(C): 36.6 (07-08-22 @ 05:02), Max: 37.2 (07-07-22 @ 21:23)  HR: 109 (07-08-22 @ 05:02) (103 - 109)  BP: 120/82 (07-08-22 @ 05:02) (120/82 - 137/81)  RR: 18 (07-08-22 @ 05:02) (18 - 18)  SpO2: 99% (07-08-22 @ 05:02) (99% - 99%)    GENERAL: NAD, lying in bed comfortably  HEAD:  Atraumatic, normocephalic  EYES: EOMI, PERRLA, conjunctiva and sclera clear  ENT: Moist mucous membranes  NECK: Supple, no JVD  HEART: Regular rate and rhythm, no murmurs, rubs, or gallops  LUNGS: Unlabored respirations.  Clear to auscultation bilaterally, no crackles, wheezing, or rhonchi  ABDOMEN: Soft, nontender, nondistended, +BS  EXTREMITIES: 2+ peripheral pulses bilaterally. No clubbing, cyanosis, or edema  NERVOUS SYSTEM:  A&Ox3, no focal deficits   SKIN: No rashes or lesions    LABS:                        11.9   7.89  )-----------( 270      ( 07 Jul 2022 07:13 )             35.8     07-07    136  |  103  |  12  ----------------------------<  81  3.5   |  21<L>  |  0.53    Ca    8.9      07 Jul 2022 07:13  Phos  4.8     07-07  Mg     2.0     07-07    TPro  6.5  /  Alb  3.6  /  TBili  0.3  /  DBili  x   /  AST  24  /  ALT  63<H>  /  AlkPhos  51  07-07                RADIOLOGY & ADDITIONAL TESTS:  Results Reviewed:   Imaging Personally Reviewed:  Electrocardiogram Personally Reviewed:    COORDINATION OF CARE:  Care Discussed with Consultants/Other Providers [Y/N]:  Prior or Outpatient Records Reviewed [Y/N]:   PROGRESS NOTE:     Patient is a 21y old  Female who presents with a chief complaint of 20yo Female with PMH of high cholesterol and T2DM. Pt admitted on 6/28 with Neuralgia and neuritis.      (07 Jul 2022 15:08)      SUBJECTIVE / OVERNIGHT EVENTS:    No acute events overnight. Patient examined at bedside with no acute complaints. Patient reports 10/10 pain overnight and did not take tramadol until late in the night. Patient reports mild improvementwith tramadol, at 7/10. Patient also reports some improvement with distracting activities, such as conversations with friends, with pain decreasing until 3/10.     Pain: Variable   Bowel Movements: regular  Urination: regular  OOB: as tolerated   PT: evaluated    REVIEW OF SYSTEMS:    CONSTITUTIONAL: No fevers or chills, + weakness   EYES/ENT: No visual changes;  No vertigo or throat pain   NECK: No pain or stiffness  RESPIRATORY: No cough, wheezing, hemoptysis; No shortness of breath  CARDIOVASCULAR: No chest pain or palpitations  GASTROINTESTINAL: No abdominal or epigastric pain. No nausea, vomiting, or hematemesis; No diarrhea or constipation. No melena or hematochezia.  GENITOURINARY: No dysuria, frequency or hematuria  NEUROLOGICAL: + numbness + weakness  SKIN: No itching, rashes      MEDICATIONS  (STANDING):  cyanocobalamin 1000 MICROGram(s) Oral daily  enoxaparin Injectable 40 milliGRAM(s) SubCutaneous every 24 hours  folic acid 5 milliGRAM(s) Oral daily  ibuprofen  Tablet. 600 milliGRAM(s) Oral three times a day  influenza   Vaccine 0.5 milliLiter(s) IntraMuscular once  lidocaine/prilocaine Cream 1 Application(s) Topical daily  multivitamin/minerals 1 Tablet(s) Oral daily  nortriptyline 10 milliGRAM(s) Oral <User Schedule>  pregabalin 100 milliGRAM(s) Oral every 8 hours  pyridoxine 100 milliGRAM(s) Oral daily  thiamine 100 milliGRAM(s) Oral daily    MEDICATIONS  (PRN):  acetaminophen     Tablet .. 650 milliGRAM(s) Oral every 6 hours PRN Temp greater or equal to 38C (100.4F), Mild Pain (1 - 3)  melatonin 3 milliGRAM(s) Oral at bedtime PRN Insomnia  senna 1 Tablet(s) Oral daily PRN Constipation  tiZANidine 2 milliGRAM(s) Oral every 8 hours PRN Muscle Spasm  traMADol 25 milliGRAM(s) Oral every 6 hours PRN Severe Pain (7 - 10)      CAPILLARY BLOOD GLUCOSE        I&O's Summary      VITALS:   T(C): 36.6 (07-08-22 @ 05:02), Max: 37.2 (07-07-22 @ 21:23)  HR: 109 (07-08-22 @ 05:02) (103 - 109)  BP: 120/82 (07-08-22 @ 05:02) (120/82 - 137/81)  RR: 18 (07-08-22 @ 05:02) (18 - 18)  SpO2: 99% (07-08-22 @ 05:02) (99% - 99%)    GENERAL: In pain   HEAD:  Atraumatic, normocephalic  EYES: EOMI, PERRLA, conjunctiva and sclera clear  ENT: Moist mucous membranes  NECK: Supple, no JVD  HEART: Regular rate and rhythm, no murmurs, rubs, or gallops  LUNGS: Unlabored respirations.  Clear to auscultation bilaterally, no crackles, wheezing, or rhonchi  ABDOMEN: Soft, nontender, nondistended, +BS  EXTREMITIES: 2+ peripheral pulses bilaterally. No clubbing, cyanosis, or edema  NERVOUS SYSTEM:  A&Ox3, + numbness + weakness  SKIN: No rashes or lesions    LABS:                        11.9   7.89  )-----------( 270      ( 07 Jul 2022 07:13 )             35.8     07-07    136  |  103  |  12  ----------------------------<  81  3.5   |  21<L>  |  0.53    Ca    8.9      07 Jul 2022 07:13  Phos  4.8     07-07  Mg     2.0     07-07    TPro  6.5  /  Alb  3.6  /  TBili  0.3  /  DBili  x   /  AST  24  /  ALT  63<H>  /  AlkPhos  51  07-07                RADIOLOGY & ADDITIONAL TESTS:  Results Reviewed:     Comprehensive Metabolic Panel in AM (07.08.22 @ 07:22)   Sodium, Serum: 138 mmol/L   Potassium, Serum: 3.6 mmol/L   Chloride, Serum: 101 mmol/L   Carbon Dioxide, Serum: 22 mmol/L   Anion Gap, Serum: 15 mmol/L   Blood Urea Nitrogen, Serum: 12 mg/dL   Creatinine, Serum: 0.50 mg/dL   Glucose, Serum: 85 mg/dL   Calcium, Total Serum: 9.5 mg/dL   Protein Total, Serum: 7.4 g/dL   Albumin, Serum: 4.2 g/dL   Bilirubin Total, Serum: 0.4 mg/dL   Alkaline Phosphatase, Serum: 56 U/L   Aspartate Aminotransferase (AST/SGOT): 25 U/L   Alanine Aminotransferase (ALT/SGPT): 76 U/L   eGFR: 136    Imaging Personally Reviewed:  Electrocardiogram Personally Reviewed:    COORDINATION OF CARE:  Care Discussed with Consultants/Other Providers [Y/N]:  Prior or Outpatient Records Reviewed [Y/N]:

## 2022-07-08 NOTE — DISCHARGE NOTE NURSING/CASE MANAGEMENT/SOCIAL WORK - PATIENT PORTAL LINK FT
You can access the FollowMyHealth Patient Portal offered by [facility] by registering at the following website: [email or web address]. By joining [employer]’s FollowMyHealth portal, you will also be able to view your health information using other applications (apps) compatible with our system.

## 2022-07-08 NOTE — PROGRESS NOTE ADULT - PROBLEM SELECTOR PLAN 1
- Previous CT brain unremarkable. Previous LP unremarkable.   - Neurology consulted, appreciate recs.   - Increase Gabapentin to 600 mg TID.   - Ibuprofen ordered to be given with gabapentin doses.  - IV tylenol for breakthrough pain  - c/w ibuprofen 600 mg TID.   - Follow-up pending laboratory studies.   - Folate level < 2.0. Will supplement.   - MRI C-spine and T-spine unremarkable.  - U/S duplex b/l LE unremarkable   - Changed pamelor to 10 mg qd   - Per neurology, contractions and leg pain are associated with EMG findings of demyelinating neuropathy, which can be explained by the folate deficiency.   - Pain management consulted. Appreciate new recommendations. Will follow-up regarding pain management. Per pain management, decreased Pamelor to once every other day for 1 week, then dc. Will continue on current pain medication and ensure patient is given tramadol at bedtime for persistent overnight pain.   - Podiatry consulted for possible pain management options, appreciate recs.   - Vascular surgery consulted, appreciate recs.  - Podiatry consulted, appreciate recs. Per podiatry, no indication for intervention at this time. - Previous CT brain unremarkable. Previous LP unremarkable.   - Neurology consulted, appreciate recs.   - Increase Gabapentin to 600 mg TID.   - Ibuprofen ordered to be given with gabapentin doses.  - IV tylenol for breakthrough pain  - c/w ibuprofen 600 mg TID.   - Follow-up pending laboratory studies.   - Folate level < 2.0. Will supplement.   - MRI C-spine and T-spine unremarkable.  - U/S duplex b/l LE unremarkable   - Changed pamelor to 10 mg qd   - Per neurology, contractions and leg pain are associated with EMG findings of demyelinating neuropathy, which can be explained by the folate deficiency.   - Podiatry consulted for possible pain management options, appreciate recs.   - Vascular surgery consulted, appreciate recs.  - Podiatry consulted, appreciate recs. Per podiatry, no indication for intervention at this time.  - Pain management consulted. Appreciate new recommendations. Will follow-up regarding pain management. Per pain management, decreased Pamelor to once every other day for 1 week, then dc. Will decrease tramadol to 25 mg BID and increase nighttime Lyrica to 150 mg.

## 2022-07-08 NOTE — PROGRESS NOTE ADULT - ATTENDING COMMENTS
Patient seen and examined.       Peripheral Neuropathy   Pain meds   Increase Folic Acid   Per neurology, contractions and leg pain are associated with EMG findings of demyelinating neuropathy, which can be explained by the folate deficiency.   Neuro following
Pain 2/2 neuropathy likley due to folate deficiency  Pain meds   Pain management following
Pain management consult appreciated   Patient reports numbness of calf with pain feet manageable with current regimen  MRI Brain spine ?? will defer to Neuro   Folate deficiency Supplement
Patient seen and examined    MRI Thoracic lumbar spine unremarkable   Follow up Dopplers
Patient seen and examined   Pain UNCONTROLLED on current regimen   R/O PAD Called Vascular surgery   Follow up recs     Podiatry consult ?? steroid injection if PAD ruled out
d/c planning
Neuropathy work up in progress   ? Etiology   Follow up Dopplers   MRI reviewed no acute findings
Patient seen and examined     Reviewed labs and imaging   Neuro following   Pain control
Patient seen and examined     T(C): 37.2 (07-01-22 @ 20:06), Max: 37.2 (07-01-22 @ 20:06)  HR: 107 (07-01-22 @ 20:06) (105 - 107)  BP: 116/86 (07-01-22 @ 20:06) (116/86 - 120/86)  RR: 18 (07-01-22 @ 20:06) (18 - 18)  SpO2: 99% (07-01-22 @ 20:06) (98% - 99%)    Reviewed labs and imaging   Folate deficiency   Follow up MRI spine   Increase Ibuprofen to 600 mg q8
Patient seen and examined   Patient lethargic       Peripheral Neuropathy   Folate deficiency     Consider discontinuing Tramadol   Pain management follow up   Neuro Vascular  Podiatry consulted   SUMMER/PVR no PAD

## 2022-07-08 NOTE — PROGRESS NOTE ADULT - PROBLEM SELECTOR PROBLEM 1
Peripheral neuropathy

## 2022-07-09 RX ORDER — NORTRIPTYLINE HYDROCHLORIDE 10 MG/1
1 CAPSULE ORAL
Qty: 2 | Refills: 0
Start: 2022-07-09 | End: 2022-07-11

## 2022-12-29 NOTE — PATIENT PROFILE ADULT - NSPROPTRIGHTSUPPORTPERSON_GEN_A_NUR
CC:  Danika Daly is here today for Physical      Medications: medications verified and updated  Added preferred pharmacy  Refills needed today?  NO    denies Latex allergy or sensitivity    Health Maintenance Due   Topic Date Due   • Shingles Vaccine (2 of 2) 10/26/2021   • COVID-19 Vaccine (4 - Booster for Pfizer series) 04/15/2022   • Cervical Cancer Risk  05/07/2022   • Pneumococcal Vaccine 0-64 (2 - PCV) 08/31/2022   • Influenza Vaccine (1) 09/01/2022   • Diabetes Foot Exam  02/18/2023   • Depression Screening  02/18/2023       Patient is due for topics as listed above but is not proceeding with Immunization(s) COVID-19, Influenza, Pneumococcal and Shingles at this time. Patient refused .    Patient would like communication of their results via:      [employer]    Cell Phone:   Telephone Information:   Mobile [phone]     Okay to leave a message containing results? Yes    Vaccine Information Statement(s) or the Emergency Use Authorization was given today. This has been reviewed, questions answered, and verbal consent given by Patient for injection(s) and administration of Shingles.      Patient tolerated without incident. See immunization grid for documentation.     same name as above

## 2023-04-25 NOTE — ED PROVIDER NOTE - ATTENDING WITH...
Mom contacted  Patient seen in office yesterday  Mom states cough is worse today than was in office. Barky-\"sounds like she has laryngitis\"  Mom denies any true respiratory distress but states at times, patient takes a pause to breathe/heavy at times. Still eating and drinking.     Mom requesting if TG can recheck tonight  To TG please advise  Advised mom to do some steam showers at this time Resident

## 2024-07-24 NOTE — ED ADULT NURSE NOTE - BRAND OF COVID-19 VACCINATION
[de-identified] : - Constitutional: This is a female in no obvious distress.  - Psych: Patient is alert and oriented to person, place and time.  Patient has a normal mood and affect. - Cardiovascular: Normal pulses throughout the upper extremities.  No significant varicosities are noted in the upper extremities. - Neuro: Strength and sensation are intact throughout the upper extremities.  Patient has normal coordination. - Respiratory:  Patient exhibits no evidence of shortness of breath or difficulty breathing. - Skin: No rashes, lesions, or other abnormalities are noted in the upper extremities. ---  Examination of her left little finger demonstrates swelling along the PIP joint.  She is tender along the volar plate.  She has full extension with some limitation of terminal flexion.  Her flexor and extensor tendons are intact.  There is no instability of the PIP joint volar plate.  She is neurovascularly intact distally. Pfizer dose 1, 2, and 3

## 2024-09-05 NOTE — ED PROVIDER NOTE - OBJECTIVE STATEMENT
in ASU:
21y F w/ PMHx of DM type 2 and high cholesterol no longer on medication c/o worsening B/L leg pain [id number], b/l leg swelling x1wk, fingertip paresthesia x2wks. While standing and walking pain is sharp and while sitting her toes cramp. Pt is unable to ambulate 2/2 to pain. States when putting on shoes the top of foot "burns". Pain is from ankle down and from knees to ankle is numb. Denies paresthesia of arms and thighs. Denies recent respiratory infection, N/V/D, fever, CP, SOB. Denies recent trauma. Denies hx of thyroid issues. Pt has been sleeping on the couch sitting up for x1wk due to pain and the swelling started after. Pt was seen last Monday to r/o GBS, as recommend by neurologist. Had an LP and CT that was negative. On Gabapentin 300mg twice a day w/o relief. Denies fmhx of similar episodes. Pt talked to her PCP today and was told to come into ED. KERON.